# Patient Record
Sex: MALE | Race: WHITE | NOT HISPANIC OR LATINO | Employment: FULL TIME | URBAN - METROPOLITAN AREA
[De-identification: names, ages, dates, MRNs, and addresses within clinical notes are randomized per-mention and may not be internally consistent; named-entity substitution may affect disease eponyms.]

---

## 2017-01-09 ENCOUNTER — COMMUNICATION - HEALTHEAST (OUTPATIENT)
Dept: FAMILY MEDICINE | Facility: CLINIC | Age: 27
End: 2017-01-09

## 2017-01-09 DIAGNOSIS — R51.9 HEADACHE: ICD-10-CM

## 2017-05-15 ENCOUNTER — COMMUNICATION - HEALTHEAST (OUTPATIENT)
Dept: FAMILY MEDICINE | Facility: CLINIC | Age: 27
End: 2017-05-15

## 2017-05-15 DIAGNOSIS — R51.9 HEADACHE: ICD-10-CM

## 2017-08-14 ENCOUNTER — OFFICE VISIT - HEALTHEAST (OUTPATIENT)
Dept: FAMILY MEDICINE | Facility: CLINIC | Age: 27
End: 2017-08-14

## 2017-08-14 DIAGNOSIS — H61.20 IMPACTED CERUMEN: ICD-10-CM

## 2017-08-14 DIAGNOSIS — Z00.00 WELL ADULT EXAM: ICD-10-CM

## 2017-08-14 DIAGNOSIS — R63.4 WEIGHT LOSS: ICD-10-CM

## 2017-08-14 DIAGNOSIS — M41.20 SCOLIOSIS (AND KYPHOSCOLIOSIS), IDIOPATHIC: ICD-10-CM

## 2017-08-14 DIAGNOSIS — Q38.5 HIGH ARCHED PALATE: ICD-10-CM

## 2017-08-14 DIAGNOSIS — R51.9 NONINTRACTABLE EPISODIC HEADACHE, UNSPECIFIED HEADACHE TYPE: ICD-10-CM

## 2017-08-14 DIAGNOSIS — Z13.21 ENCOUNTER FOR VITAMIN DEFICIENCY SCREENING: ICD-10-CM

## 2017-08-14 DIAGNOSIS — R29.91 MARFANOID HABITUS: ICD-10-CM

## 2017-08-14 DIAGNOSIS — Z13.0 SCREENING FOR DEFICIENCY ANEMIA: ICD-10-CM

## 2017-08-14 ASSESSMENT — MIFFLIN-ST. JEOR: SCORE: 1391.71

## 2017-08-17 LAB
GLIADIN IGA SER-ACNC: 0.7 U/ML
GLIADIN IGG SER-ACNC: 0.6 U/ML
IGA SERPL-MCNC: 232 MG/DL (ref 65–400)
TTG IGA SER-ACNC: 0.6 U/ML
TTG IGG SER-ACNC: 2.2 U/ML

## 2017-08-18 ENCOUNTER — COMMUNICATION - HEALTHEAST (OUTPATIENT)
Dept: FAMILY MEDICINE | Facility: CLINIC | Age: 27
End: 2017-08-18

## 2017-08-22 ENCOUNTER — COMMUNICATION - HEALTHEAST (OUTPATIENT)
Dept: FAMILY MEDICINE | Facility: CLINIC | Age: 27
End: 2017-08-22

## 2018-05-01 ENCOUNTER — COMMUNICATION - HEALTHEAST (OUTPATIENT)
Dept: FAMILY MEDICINE | Facility: CLINIC | Age: 28
End: 2018-05-01

## 2018-05-01 DIAGNOSIS — R51.9 NONINTRACTABLE EPISODIC HEADACHE, UNSPECIFIED HEADACHE TYPE: ICD-10-CM

## 2018-06-18 ENCOUNTER — COMMUNICATION - HEALTHEAST (OUTPATIENT)
Dept: FAMILY MEDICINE | Facility: CLINIC | Age: 28
End: 2018-06-18

## 2018-11-05 ENCOUNTER — COMMUNICATION - HEALTHEAST (OUTPATIENT)
Dept: FAMILY MEDICINE | Facility: CLINIC | Age: 28
End: 2018-11-05

## 2018-11-05 DIAGNOSIS — R51.9 NONINTRACTABLE EPISODIC HEADACHE, UNSPECIFIED HEADACHE TYPE: ICD-10-CM

## 2019-01-10 ENCOUNTER — COMMUNICATION - HEALTHEAST (OUTPATIENT)
Dept: FAMILY MEDICINE | Facility: CLINIC | Age: 29
End: 2019-01-10

## 2019-01-10 DIAGNOSIS — R51.9 NONINTRACTABLE EPISODIC HEADACHE, UNSPECIFIED HEADACHE TYPE: ICD-10-CM

## 2019-01-17 ENCOUNTER — COMMUNICATION - HEALTHEAST (OUTPATIENT)
Dept: FAMILY MEDICINE | Facility: CLINIC | Age: 29
End: 2019-01-17

## 2019-01-17 DIAGNOSIS — R51.9 NONINTRACTABLE EPISODIC HEADACHE, UNSPECIFIED HEADACHE TYPE: ICD-10-CM

## 2019-02-28 ENCOUNTER — OFFICE VISIT - HEALTHEAST (OUTPATIENT)
Dept: FAMILY MEDICINE | Facility: CLINIC | Age: 29
End: 2019-02-28

## 2019-02-28 DIAGNOSIS — Z13.220 SCREENING FOR LIPOID DISORDERS: ICD-10-CM

## 2019-02-28 DIAGNOSIS — Z00.00 WELL ADULT EXAM: ICD-10-CM

## 2019-02-28 DIAGNOSIS — R51.9 NONINTRACTABLE EPISODIC HEADACHE, UNSPECIFIED HEADACHE TYPE: ICD-10-CM

## 2019-02-28 ASSESSMENT — MIFFLIN-ST. JEOR: SCORE: 1396.24

## 2019-03-03 LAB
CHOLEST SERPL-MCNC: 162 MG/DL
HDL SERPL QN: 10 NM
HDL SERPL-SCNC: 28.3 UMOL/L
HDLC SERPL-MCNC: 69 MG/DL (ref 40–59)
HLD.LARGE SERPL-SCNC: 11.8 UMOL/L
LDL SERPL QN: 21.5 NM
LDL SERPL-SCNC: 755 NMOL/L
LDL SMALL SERPL-SCNC: <165 NMOL/L
LDLC SERPL CALC-MCNC: 86 MG/DL
PATHOLOGY STUDY: ABNORMAL
TRIGL SERPL-MCNC: 36 MG/DL (ref 30–149)
VLDL LARGE SERPL-SCNC: 2 NMOL/L
VLDL SERPL QN: 49 NM

## 2019-03-04 ENCOUNTER — COMMUNICATION - HEALTHEAST (OUTPATIENT)
Dept: FAMILY MEDICINE | Facility: CLINIC | Age: 29
End: 2019-03-04

## 2020-04-06 ENCOUNTER — COMMUNICATION - HEALTHEAST (OUTPATIENT)
Dept: FAMILY MEDICINE | Facility: CLINIC | Age: 30
End: 2020-04-06

## 2020-04-06 DIAGNOSIS — R51.9 NONINTRACTABLE EPISODIC HEADACHE, UNSPECIFIED HEADACHE TYPE: ICD-10-CM

## 2020-06-18 ENCOUNTER — OFFICE VISIT (OUTPATIENT)
Dept: URGENT CARE | Facility: URGENT CARE | Age: 30
End: 2020-06-18
Payer: OTHER MISCELLANEOUS

## 2020-06-18 VITALS
BODY MASS INDEX: 19.29 KG/M2 | HEART RATE: 54 BPM | DIASTOLIC BLOOD PRESSURE: 84 MMHG | HEIGHT: 66 IN | TEMPERATURE: 98.4 F | SYSTOLIC BLOOD PRESSURE: 126 MMHG | WEIGHT: 120 LBS | OXYGEN SATURATION: 100 %

## 2020-06-18 DIAGNOSIS — S61.012A LACERATION OF LEFT THUMB WITHOUT FOREIGN BODY WITHOUT DAMAGE TO NAIL, INITIAL ENCOUNTER: Primary | ICD-10-CM

## 2020-06-18 PROCEDURE — 12001 RPR S/N/AX/GEN/TRNK 2.5CM/<: CPT | Mod: FA | Performed by: FAMILY MEDICINE

## 2020-06-18 PROCEDURE — 99203 OFFICE O/P NEW LOW 30 MIN: CPT | Mod: 25 | Performed by: FAMILY MEDICINE

## 2020-06-18 PROCEDURE — 90715 TDAP VACCINE 7 YRS/> IM: CPT | Performed by: FAMILY MEDICINE

## 2020-06-18 PROCEDURE — 90471 IMMUNIZATION ADMIN: CPT | Performed by: FAMILY MEDICINE

## 2020-06-18 RX ORDER — SUMATRIPTAN 100 MG/1
100 TABLET, FILM COATED ORAL
COMMUNITY
End: 2022-04-04

## 2020-06-18 ASSESSMENT — MIFFLIN-ST. JEOR: SCORE: 1442.07

## 2020-06-19 NOTE — PROGRESS NOTES
"Subjective:   Cayetano Agrawal is a 29 year old male who presents for   Chief Complaint   Patient presents with     Urgent Care     Pt in clinic to have eval for left hand thumb laceration.     Laceration     Last tetanus 2010     Reached for auto part with non dominant (left )hand and cut his left finger, comes in as this is deep and may need stitches. Able to move finger fully.     Thinks he is due for tetanus shot.     Next work shift is tomorrow.        There are no active problems to display for this patient.    Due to workman's compensation      ROS:  As above per HPI    Objective:   /84   Pulse 54   Temp 98.4  F (36.9  C) (Oral)   Ht 1.676 m (5' 6\")   Wt 54.4 kg (120 lb)   SpO2 100%   BMI 19.37 kg/m  , Body mass index is 19.37 kg/m .  Gen:  NAD, well-nourished, sitting in chair comfortably  HEENT: EOMI, sclera anicteric, Head normocephalic, ; nares patent; moist mucous membranes  Neck: trachea midline, no thyromegaly  CV:  Hemodynamically stable  Pulm:  no increased work of breathing   Extrem: no cyanosis, edema or clubbing  Skin: no obvious rashes or abnormalities  Psych: Euthymic, linear thoughts, normal rate of speech  Left hand: intact movement in flexion and extension of this thumb, horizontal laceration approx 1.25cm across mid-thumb exposing fatty layers, hemostasis present          No results found for any visits on 06/18/20.    Assessment & Plan:   Cayetano Agrawal, 29 year old male who presents with:  Laceration of left thumb without foreign body without damage to nail, initial encounter  - TDAP, IM (10 - 64 YRS) - Adacel  Given depth of laceration an assessment of the flexor tendon was performed which shows intact movement in flexion and also extension. Has intact sensation to touch. No damage to nailbed    Wound was soaked in water /chlorhexidine for approximately ten minutes, 1mL of 1% lidocaine used to anesthetize the area. FOUR of 5-0 nylon suture was used to bring together the wound " using interrupted sutures ( at areas showing a notable gape). Wound was dressed prior to discharge.     TDAP booster given today in clinic today.     Care tips provided in AVS    Moe Virgen MD   [facility]    The use of Dragon/Digital Chocolate dictation services may have been used to construct the content in this note; any grammatical or spelling errors are non-intentional. Please contact the author of this note directly if you are in need of any clarification.

## 2020-06-19 NOTE — PATIENT INSTRUCTIONS
Return in 8-10 days to have your stitches/staples removed. Call ahead for a 'suture removal' visit at one of our [facility] sites    Avoid soaking/submerging the area for 2 days. Okay to change the bandage we placed in 1 days. Change bandage/dressing at least once daily.   Keep the area moisturized with bacitracin or vaseline.     If you develop increasing pain/discomfort/swelling/redness/warmth, a fever or drainage of pus please return right away to be re-evaluated as this could be a sign of infection

## 2020-08-24 ENCOUNTER — COMMUNICATION - HEALTHEAST (OUTPATIENT)
Dept: FAMILY MEDICINE | Facility: CLINIC | Age: 30
End: 2020-08-24

## 2020-12-18 ENCOUNTER — OFFICE VISIT - HEALTHEAST (OUTPATIENT)
Dept: FAMILY MEDICINE | Facility: CLINIC | Age: 30
End: 2020-12-18

## 2020-12-18 DIAGNOSIS — Z13.220 SCREENING FOR LIPID DISORDERS: ICD-10-CM

## 2020-12-18 DIAGNOSIS — Z13.1 SCREENING FOR DIABETES MELLITUS: ICD-10-CM

## 2020-12-18 DIAGNOSIS — Z13.0 SCREENING FOR DEFICIENCY ANEMIA: ICD-10-CM

## 2020-12-18 DIAGNOSIS — Z00.00 WELL ADULT EXAM: ICD-10-CM

## 2020-12-18 DIAGNOSIS — Z13.21 ENCOUNTER FOR VITAMIN DEFICIENCY SCREENING: ICD-10-CM

## 2020-12-18 DIAGNOSIS — G95.0 SYRINGOMYELIA AND SYRINGOBULBIA (H): ICD-10-CM

## 2020-12-18 DIAGNOSIS — G43.109 MIGRAINE WITH AURA AND WITHOUT STATUS MIGRAINOSUS, NOT INTRACTABLE: ICD-10-CM

## 2020-12-18 DIAGNOSIS — Z20.9 EXPOSURE TO POTENTIAL INFECTION: ICD-10-CM

## 2020-12-18 DIAGNOSIS — M41.20 SCOLIOSIS (AND KYPHOSCOLIOSIS), IDIOPATHIC: ICD-10-CM

## 2020-12-18 LAB
ALBUMIN SERPL-MCNC: 4.4 G/DL (ref 3.5–5)
ALBUMIN UR-MCNC: NEGATIVE MG/DL
ALP SERPL-CCNC: 66 U/L (ref 45–120)
ALT SERPL W P-5'-P-CCNC: 17 U/L (ref 0–45)
ANION GAP SERPL CALCULATED.3IONS-SCNC: 10 MMOL/L (ref 5–18)
APPEARANCE UR: CLEAR
AST SERPL W P-5'-P-CCNC: 28 U/L (ref 0–40)
BASOPHILS # BLD AUTO: 0 THOU/UL (ref 0–0.2)
BASOPHILS NFR BLD AUTO: 1 % (ref 0–2)
BILIRUB SERPL-MCNC: 1.1 MG/DL (ref 0–1)
BILIRUB UR QL STRIP: NEGATIVE
BUN SERPL-MCNC: 19 MG/DL (ref 8–22)
CALCIUM SERPL-MCNC: 9.2 MG/DL (ref 8.5–10.5)
CHLORIDE BLD-SCNC: 103 MMOL/L (ref 98–107)
CHOLEST SERPL-MCNC: 193 MG/DL
CO2 SERPL-SCNC: 28 MMOL/L (ref 22–31)
COLOR UR AUTO: YELLOW
CREAT SERPL-MCNC: 0.74 MG/DL (ref 0.7–1.3)
EOSINOPHIL # BLD AUTO: 0.1 THOU/UL (ref 0–0.4)
EOSINOPHIL NFR BLD AUTO: 3 % (ref 0–6)
ERYTHROCYTE [DISTWIDTH] IN BLOOD BY AUTOMATED COUNT: 11.1 % (ref 11–14.5)
FASTING STATUS PATIENT QL REPORTED: NORMAL
GFR SERPL CREATININE-BSD FRML MDRD: >60 ML/MIN/1.73M2
GLUCOSE BLD-MCNC: 75 MG/DL (ref 70–125)
GLUCOSE UR STRIP-MCNC: NEGATIVE MG/DL
HCT VFR BLD AUTO: 42.1 % (ref 40–54)
HDLC SERPL-MCNC: 71 MG/DL
HGB BLD-MCNC: 14.8 G/DL (ref 14–18)
HGB UR QL STRIP: NEGATIVE
KETONES UR STRIP-MCNC: NEGATIVE MG/DL
LDLC SERPL CALC-MCNC: 115 MG/DL
LEUKOCYTE ESTERASE UR QL STRIP: NEGATIVE
LYMPHOCYTES # BLD AUTO: 1.6 THOU/UL (ref 0.8–4.4)
LYMPHOCYTES NFR BLD AUTO: 40 % (ref 20–40)
MCH RBC QN AUTO: 32.1 PG (ref 27–34)
MCHC RBC AUTO-ENTMCNC: 35.1 G/DL (ref 32–36)
MCV RBC AUTO: 92 FL (ref 80–100)
MONOCYTES # BLD AUTO: 0.4 THOU/UL (ref 0–0.9)
MONOCYTES NFR BLD AUTO: 10 % (ref 2–10)
NEUTROPHILS # BLD AUTO: 1.9 THOU/UL (ref 2–7.7)
NEUTROPHILS NFR BLD AUTO: 47 % (ref 50–70)
NITRATE UR QL: NEGATIVE
PH UR STRIP: 6 [PH] (ref 5–8)
PLATELET # BLD AUTO: 200 THOU/UL (ref 140–440)
PMV BLD AUTO: 7.8 FL (ref 7–10)
POTASSIUM BLD-SCNC: 3.9 MMOL/L (ref 3.5–5)
PROT SERPL-MCNC: 7.4 G/DL (ref 6–8)
RBC # BLD AUTO: 4.6 MILL/UL (ref 4.4–6.2)
SODIUM SERPL-SCNC: 141 MMOL/L (ref 136–145)
SP GR UR STRIP: 1.02 (ref 1–1.03)
TRIGL SERPL-MCNC: 34 MG/DL
TSH SERPL DL<=0.005 MIU/L-ACNC: 1.38 UIU/ML (ref 0.3–5)
UROBILINOGEN UR STRIP-ACNC: NORMAL
WBC: 4 THOU/UL (ref 4–11)

## 2020-12-18 ASSESSMENT — MIFFLIN-ST. JEOR: SCORE: 1407.3

## 2020-12-18 NOTE — ASSESSMENT & PLAN NOTE
Headaches  Migraine  Onset pressure between the nose  If he catches it early can typically go away with the use of sumatriptan  He has these about every other day  He does feel like he is light sensitive  If he gets nauseated feels like he is going to vomit  The only thing that he thinks triggers are not eating consistently  Evidence of mild to wake him up  Tried multiple preventatives including Topamax, propranolol, nortriptyline amitriptyline

## 2020-12-21 LAB
25(OH)D3 SERPL-MCNC: 17.7 NG/ML (ref 30–80)
25(OH)D3 SERPL-MCNC: 17.7 NG/ML (ref 30–80)

## 2020-12-22 ENCOUNTER — COMMUNICATION - HEALTHEAST (OUTPATIENT)
Dept: NURSING | Facility: CLINIC | Age: 30
End: 2020-12-22

## 2020-12-22 ENCOUNTER — OFFICE VISIT - HEALTHEAST (OUTPATIENT)
Dept: PHARMACY | Facility: CLINIC | Age: 30
End: 2020-12-22

## 2020-12-22 ENCOUNTER — COMMUNICATION - HEALTHEAST (OUTPATIENT)
Dept: INTERNAL MEDICINE | Facility: CLINIC | Age: 30
End: 2020-12-22

## 2020-12-22 DIAGNOSIS — G43.109 MIGRAINE WITH AURA AND WITHOUT STATUS MIGRAINOSUS, NOT INTRACTABLE: ICD-10-CM

## 2020-12-22 PROCEDURE — 99605 MTMS BY PHARM NP 15 MIN: CPT | Performed by: PHARMACIST

## 2020-12-22 PROCEDURE — 99607 MTMS BY PHARM ADDL 15 MIN: CPT | Performed by: PHARMACIST

## 2020-12-23 ENCOUNTER — COMMUNICATION - HEALTHEAST (OUTPATIENT)
Dept: INTERNAL MEDICINE | Facility: CLINIC | Age: 30
End: 2020-12-23

## 2020-12-23 ENCOUNTER — COMMUNICATION - HEALTHEAST (OUTPATIENT)
Dept: SCHEDULING | Facility: CLINIC | Age: 30
End: 2020-12-23

## 2020-12-31 ENCOUNTER — COMMUNICATION - HEALTHEAST (OUTPATIENT)
Dept: NURSING | Facility: CLINIC | Age: 30
End: 2020-12-31

## 2021-02-23 ENCOUNTER — COMMUNICATION - HEALTHEAST (OUTPATIENT)
Dept: INTERNAL MEDICINE | Facility: CLINIC | Age: 31
End: 2021-02-23

## 2021-02-23 DIAGNOSIS — G43.109 MIGRAINE WITH AURA AND WITHOUT STATUS MIGRAINOSUS, NOT INTRACTABLE: ICD-10-CM

## 2021-04-09 ENCOUNTER — COMMUNICATION - HEALTHEAST (OUTPATIENT)
Dept: PHARMACY | Facility: CLINIC | Age: 31
End: 2021-04-09

## 2021-04-09 DIAGNOSIS — G43.109 MIGRAINE WITH AURA AND WITHOUT STATUS MIGRAINOSUS, NOT INTRACTABLE: ICD-10-CM

## 2021-05-15 ENCOUNTER — HEALTH MAINTENANCE LETTER (OUTPATIENT)
Age: 31
End: 2021-05-15

## 2021-05-24 ENCOUNTER — COMMUNICATION - HEALTHEAST (OUTPATIENT)
Dept: INTERNAL MEDICINE | Facility: CLINIC | Age: 31
End: 2021-05-24

## 2021-05-24 DIAGNOSIS — R51.9 NONINTRACTABLE EPISODIC HEADACHE, UNSPECIFIED HEADACHE TYPE: ICD-10-CM

## 2021-05-31 VITALS — HEIGHT: 66 IN | BODY MASS INDEX: 17.68 KG/M2 | WEIGHT: 110 LBS

## 2021-06-02 VITALS — WEIGHT: 111 LBS | BODY MASS INDEX: 17.84 KG/M2 | HEIGHT: 66 IN

## 2021-06-05 VITALS
SYSTOLIC BLOOD PRESSURE: 100 MMHG | DIASTOLIC BLOOD PRESSURE: 60 MMHG | HEIGHT: 66 IN | HEART RATE: 91 BPM | OXYGEN SATURATION: 98 % | WEIGHT: 113.44 LBS | BODY MASS INDEX: 18.23 KG/M2 | TEMPERATURE: 97.7 F

## 2021-06-07 NOTE — TELEPHONE ENCOUNTER
Medication: Sumatriptan (IMTREX) 100 MG tablet  Pharmacy: [facility] in [place]  Last OV: 2/28/19 with Dr. Moss  Last refill: 2/28/19, #18 12 refills authorized by Dr. Ajay Morin, [facility]

## 2021-06-12 NOTE — PROGRESS NOTES
CHIEF COMPLAINT: Cayetano Agrawal had concerns including Annual Exam.    [place]: 1.............. had concerns including Annual Exam.    1. Well adult exam    2. Nonintractable episodic headache, unspecified headache type    3. High arched palate    4. Kyphoscoliosis    5. Impacted cerumen    6. Marfanoid habitus    7. Encounter for vitamin deficiency screening    8. Screening for deficiency anemia    9. Weight loss      No problem-specific Assessment & Plan notes found for this encounter.      CC:              Well adult  Headaches    Headaches have not changed 2-3 times weekly some benefit with acupuncture occasional nighttime awakening better with protein worse with alcohol especially beer  He has been [place] gluten-free    Depression seeing a therapist last 3-4 weeks has been feeling better to the PHQ 9 today    Family history reviewed      Associated Sx:   Some photophobia improvement with the use of Imitrex has tried vitamin therapy and multiple headache medications the best prophylactic is been acupuncture        SUBJECTIVE:  Cayetano Agrawal is a 27 y.o. male    No past medical history on file.  Past Surgical History:   Procedure Laterality Date     CERVICAL FUSION      [facility] spine     SPINAL GROWTH RODS       TONSILLECTOMY       Topiramate and Zonisamide  Current Outpatient Prescriptions   Medication Sig Dispense Refill     SUMAtriptan (IMITREX) 100 MG tablet TAKE 1 TABLET BY MOUTH FOR MIGRAINE RELIEF. MAY REPEAT IN 2 HOURS *MAX 200MG/DAY 18 tablet 6     No current facility-administered medications for this visit.      Family History   Problem Relation Age of Onset     Alcohol abuse Father      Social History     Social History     Marital status: Single     Spouse name: N/A     Number of children: N/A     Years of education: N/A     Social History Main Topics     Smoking status: Never Smoker     Smokeless tobacco: Never Used     Alcohol use 1.2 oz/week     2 Cans of beer per week     Drug use: No      "Sexual activity: Not Currently     Partners: Female     Birth control/ protection: Condom     Other Topics Concern     None     Social History Narrative     Patient Active Problem List   Diagnosis     Syringomyelia     Cerumen Impaction     Nummular Eczematous Dermatitis     Kyphoscoliosis     Headache                                              SOCIAL: He  reports that he has never smoked. He has never used smokeless tobacco. He reports that he drinks about 1.2 oz of alcohol per week  He reports that he does not use illicit drugs.    REVIEW OF SYSTEMS:     FAMILY HISTORY NOT PERTINENT TO CHIEF COMPLAINT OR PRESENTING PROBLEM  Review of systems otherwise negative as requested from patient, except   Positive ROS outlined and discussed in [place].    OBJECTIVE:  /60  Pulse 85  Resp 12  Ht 5' 6\" (1.676 m)  Wt 110 lb (49.9 kg)  SpO2 98%  BMI 17.75 kg/m2    GENERAL:     No acute distress.   Alert and oriented X 3         Physical:    Sclera clear  High arched palate  TMs are packed with cerumen  Thyroid pump nontender without nodules  Deformity of the neck back kyphoscoliosis  Lungs are clear  Cardiac no murmur regular rate and rhythm  Abdomen soft nontender with positive bowel sounds  Palpable femoral pulses  No joint deformities  Wingspan is probably as long as his height  Overlapping fingers at grasp the wrist          ASSESSMENT & PLAN      Cayetano was seen today for annual exam.    Diagnoses and all orders for this visit:    Well adult exam  -     Comprehensive Metabolic Panel  -     HM1(CBC and Differential)  -     Vitamin D, Total (25-Hydroxy)  -     C -Reactive Protein, High Sensitivity  -     Urinalysis-UC if Indicated  -     Vitamin B12  -     Thyroid Cascade  -     HM1 (CBC with Diff)    Nonintractable episodic headache, unspecified headache type  -     SUMAtriptan (IMITREX) 100 MG tablet; TAKE 1 TABLET BY MOUTH FOR MIGRAINE RELIEF. MAY REPEAT IN 2 HOURS *MAX 200MG/DAY  -     Celiac(Gluten)Antibody " Panel    High arched palate  -     Echo Complete; Future    Kyphoscoliosis  -     Echo Complete; Future    Impacted cerumen    Marfanoid habitus  -     Echo Complete; Future    Encounter for vitamin deficiency screening  -     Vitamin D, Total (25-Hydroxy)  -     Vitamin B12    Screening for deficiency anemia  -     HM1(CBC and Differential)  -     Vitamin B12  -     HM1 (CBC with Diff)    Weight loss  -     Celiac(Gluten)Antibody Panel      Lifestyle interventions interventions discussed today adequate protein intake, adequate hydration, avoidance of excessive caffeine, avoidance of tyramine rich foods  Whole Foods diet  Because of his high arch palate scoliosis as well as overlapping fingers will check an echocardiogram to rule out aortic arch problems or marfanoid-like syndrome    Return in about 1 year (around 8/14/2018), or if symptoms worsen or fail to improve.       Anticipatory Guidance and Symptomatic Cares Discussed   Advised to call back directly if there are further questions, or if these symptoms fail to improve as anticipated or worsen.  Return to clinic if patient has a clinical concern that warrants an exam.         30  Min Total Time, > 50% counseling and coordination of Care    Jose Antonio Moss MD  Family Medicine   [facility] [address] [phone]

## 2021-06-13 NOTE — PATIENT INSTRUCTIONS - HE
Great to see you Cayetano!!    We are doing blood work today    Because your headaches are fairly frequent and your history of back surgery neck surgery I think we should do an MRI of your brain this will check to see if there is excessive pressure which could be generating your headaches    I will have you talk with Cirilo Merchant she is our pharmacist here at [facility] she can help facilitate the approval of a migraine prevention medicine in the CRGP inhibitor category such as Ajovy, Aimovig, Emgality    Stay active    Let me know if there is any changes in your clinical condition    Adult Physical AVS      Thank you for scheduling and completing a Physical Check up!      There has been suggestions that this is an Unnecessary part of the current care for patients by some. I do not agree!    It is a reminder to take stock in an overall health plan.     It also hopefully will engage dialogue about wellness and focusing on measure to stay well and fit, hopefully avoiding extra trips to see us!      -----------Wellness Pillars-----------    1. Nutrient Dense Nutrition-- we are or will become and are activated by what we eat! It is paramount that we all focus on eating well. This means trying to eat ?hole Foods,  single ingredient foods that nourish and activate our bodies. Food that are high in nutrients help run our bodies in a way that herbert and can transform our health and help us to heal and repair. Major groups include. Fats----preferably plant based. These help ours brains and nervous system. Proteins---from plants and animals. Nuts, Beans and Animal proteins. These help our musculoskeletal system. Complex Carbohydrates---fresh fruits vegetables, and whole grains. In order to maintain balance, we must give our bodies balanced nutrition. There are things we should avoid. Most processed foods and all added sugar should be avoided. If you do not prepare your own food, order simple foods a la carte. Order a protein  and pair it with a side of vegetables. Vegetable should occupy more than half of your plate. Try to avoid simple carbohydrates like chips or fries.   2. Restorative Sleep----we all need sleep to allow our bodies to heal and repair. Sleep is indispensable and necessary. Good quality sleep is different from the simple quantitative amount of sleep. It is possible to sleep without getting any rest or restorative sleep. This is why we ask about refreshing sleep, because it is possible to sleep and not have restorative sleep. If your sleep is not refreshing, you may requires a visit with a Sleep Specialist to help sort this out. For quantity, aim for 8-10 hours daily.   3. Movement---exercise has numerous health benefits. Bottom line, in order to do the things we do in life, it is necessary to condition, nurture and repair our machine. Food provides the tools and the basic repair structure and vital nutrients. Rest allows time and focus for repair and restoration. Exercise conditions and activated reparative mechanisms. I would suggest thinking about one's high school weight as a rule and aim for a weight plus 15 pounds for men and 10 pounds for women as a goal. Also we should strive to engage in intentional activity 3 to 4 days of cardio fitness 30-60 minutes and 1-3 days of strength training. We want to be fit as we age and have stamina to do activities of daily living and beyond. Walk, bike, swim, run, box, dance, and so on, find your thing! The benefits are incredible! Exercise lowers blood pressure, helps treat and prevent diabetes and helps us live longer and in a way that is more satisfying. As Arnie says,  Just Do It!  4. Mindfulness----get in touch with your mind body connection. Take time daily to reflect on and be cognizant of how you are doing----eating, working, parenting, interacting with loved ones, friends and others. Be intentional and present in relating to things in which you are engaged.      Preventative Care    Colon Cancer screening. It may not be glamorous, but it saves lives and may save yours. There is colonoscopy and immuno-chemical testing. Pick your mode , but get it done. If there is a first degree relative that has had Colon Cancer, we may recommend screening 10 years before the age of that index case.     Breast Cancer Screening. This is mostly for women. Get to know the architecture of your breasts. If it makes you feel more comfortable, engage your partner as well. If something strikes you as ?ot right,  get it checked out. Mammogram breast cancer screening does detect cancers. Self breast exams can detect cancers. Guidelines vary but in general start screen in adulthood for self exams and mammography in your 40s. If there is a first degree relative or many with cancers, this may be earlier or involve genetic screening.     Pap Smears Cervical Cancer Screening---again women. Start screening after sexual activity or intercourse begins. Cervical cancer really is tied to exposure to HPV virus and this is related to sexual intercourse. Cervical cancer is treatable and preventable, as Pap smears should detect changes BEFORE cancerous transformation.   All women that have a cervix should be screen between 21-65 as a rule. Intervals vary based on age and medical history.     Vaccines. Yes there is controversy. But I still think vaccines save lives and reduce disease burden in our human populations. Please consider getting vaccinated as you are due for Vaccines.     About Vitamins and Supplements     I do recommend that adult and kids consider a multivitamin as way to enrich our nutrition.     A solid multivitamin. Ask one of us for recommendations. Our pharmacy carry some high quality lower cost vitamins that we have recommends.     [place] 3/6 Oils Fish Oils, Flax Oils, Krill Oils, Algea Oils, and Cumin Seed and Borage Oils are among the Omega 3s and 6s. Good oils nourish our nervous system and  engage our immune system in positive ways.     Vitamin D. If you are in [place]. You probably need some. We shoot for a level of 50-60. So sometime this takes staring lower and titrating up a supplement. Start at 1542-0386 IU wit a fatty meal and check levels.             Certain supplements may help with our gut's immune system or Microbiome.   Start with plants, many and of diverse colors.   Consider cultured foods with live active bacteria. Examples are Yogurt, Kefir and Kombucha.     Eat Prebiotic foods from the Chicori family, asparagus, bananas, inulin fibers and more.     Other supplements that may help are Zinc Carnosine, D-limonene, Vitamin D and Colostrum.     It may prudent to try the Elimination Diet and eliminate certain foods such as Wheat products, Dairy Products and Especially Refined Sugars.       We are screening multiple issues:     High Blood Pressure.   We ideally have readings less than 130 on the top number and less than 80 on the bottom.     Diabetes.   Diabetes is the body not processing sugars in an efficient way. When sugars are not dealt with in an efficient manner, every part of the body can be effected, the heart such as a heart attack or failure, the brain such as a stroke and really any part of the body. Insulin levels being overworked really drives diabetes. Lowering intake of simple sugar and exercising are two major ways to treat and stave off Diabetes.     Heart Disease:  Heart issues usually arise out of a combination of genetic issues and life stressors and choices.   Focusing on the Pillars of Wellness are ways to help prevent heart disease. Avoiding tobacco, limiting alcohol intake to moderate intake and addressing out  of balance cholesterol, presence of diabetes and persistently elevated blood pressure may require medications in addition to life style changes.     Skin cancer is typically due to cumulative Sun Damage. There are other genetic factor as well. If skin looks  irritated or a mole changes color, shape, size or has irregular borders, get it checked out. Skin exams can also save lives.     If you are Diabetic or Have Known Heart Disease. We have goals for your best Care     A1C. For Diabetics     This is a measure of how well controlled your sugars are over the last 3 months. In general, we would like the percent number less than 7% for most diabetics. In the morning and before all meals the sugar number should be less than 150. If you are diabetic and this is the case, you are managing well. The Pillars of Wellness are still a guide to keeping your body in balance.     Blood Pressure for Diabetics and Heart Disease     Top < 130 Bottom < 80  This goal of blood pressure control reduces Diabetes complications, such as stroke or heart attack. Life style first and add medication if consistently high.     No Tobacco. For Anyone!    Smoking tobacco or chewing produces by products that are particularly harmful to Diabetics, but really all of us. I implore you to not use tobacco products.     Aspirin for Heart Disease Patient     If you have high blood pressure, stroke or heart disease risk, you probably would benefit from a baby aspirin. There reasons to avoid aspirin such as allergies, risks for bleeding, etc. have the discussion of aspirin should be part of your therapy.     Statin Medications for Heart Disease, Vascular Disease or High Risk Patients    These medicine have statistically been shown to benefit Diabetic patients, especially those with disordered cholesterol profiles. I like to do an NMR panel that shows Atherogenic Risk (Stroke/ Heart Attack) and Insulin Resistance. Likely this may be recommended as part of you treatment plan.       Sima

## 2021-06-13 NOTE — PROGRESS NOTES
ASSESSMENT & PLAN    Migraine with aura and without status migrainosus, not intractable  Headaches  Migraine  Onset pressure between the nose  If he catches it early can typically go away with the use of sumatriptan  He has these about every other day  He does feel like he is light sensitive  If he gets nauseated feels like he is going to vomit  The only thing that he thinks triggers are not eating consistently  Evidence of mild to wake him up  Tried multiple preventatives including Topamax, propranolol, nortriptyline amitriptyline          Cayetano was seen today for annual exam.    Diagnoses and all orders for this visit:    Migraine with aura and without status migrainosus, not intractable  -     Ambulatory referral to Medication Management    Well adult exam  -     Comprehensive Metabolic Panel  -     HM1(CBC and Differential)  -     Vitamin D, Total (25-Hydroxy)  -     Urinalysis-UC if Indicated  -     Lipid Cascade  -     Thyroid [place]    Exposure to potential infection  -     COVID-19 Virus (Coronavirus) Antibody & Titer Reflex    Screening for diabetes mellitus  -     Comprehensive Metabolic Panel  -     Urinalysis-UC if Indicated    Encounter for vitamin deficiency screening  -     Vitamin D, Total (25-Hydroxy)    Screening for lipid disorders  -     Lipid Cascade  -     Thyroid [place]    Screening for deficiency anemia  -     HM1(CBC and Differential)    Other orders  -     Cancel: Influenza, Recombinant, Inj, Quadrivalent, PF, 18+YRS        Patient Instructions   Great to see you Cayetano!!    We are doing blood work today    Because your headaches are fairly frequent and your history of back surgery neck surgery I think we should do an MRI of your brain this will check to see if there is excessive pressure which could be generating your headaches    I will have you talk with Cirilo Merchant she is our pharmacist here at [facility] she can help facilitate the approval of a migraine prevention medicine in the  CRGP inhibitor category such as Ajovy, Aimovig, Emgality    Stay active    Let me know if there is any changes in your clinical condition    Adult Physical AVS      Thank you for scheduling and completing a Physical Check up!      There has been suggestions that this is an Unnecessary part of the current care for patients by some. I do not agree!    It is a reminder to take stock in an overall health plan.     It also hopefully will engage dialogue about wellness and focusing on measure to stay well and fit, hopefully avoiding extra trips to see us!      -----------Wellness Pillars-----------    1. Nutrient Dense Nutrition-- we are or will become and are activated by what we eat! It is paramount that we all focus on eating well. This means trying to eat ?hole Foods,  single ingredient foods that nourish and activate our bodies. Food that are high in nutrients help run our bodies in a way that herbert and can transform our health and help us to heal and repair. Major groups include. Fats----preferably plant based. These help ours brains and nervous system. Proteins---from plants and animals. Nuts, Beans and Animal proteins. These help our musculoskeletal system. Complex Carbohydrates---fresh fruits vegetables, and whole grains. In order to maintain balance, we must give our bodies balanced nutrition. There are things we should avoid. Most processed foods and all added sugar should be avoided. If you do not prepare your own food, order simple foods a la carte. Order a protein and pair it with a side of vegetables. Vegetable should occupy more than half of your plate. Try to avoid simple carbohydrates like chips or fries.   2. Restorative Sleep----we all need sleep to allow our bodies to heal and repair. Sleep is indispensable and necessary. Good quality sleep is different from the simple quantitative amount of sleep. It is possible to sleep without getting any rest or restorative sleep. This is why we ask about  refreshing sleep, because it is possible to sleep and not have restorative sleep. If your sleep is not refreshing, you may requires a visit with a Sleep Specialist to help sort this out. For quantity, aim for 8-10 hours daily.   3. Movement---exercise has numerous health benefits. Bottom line, in order to do the things we do in life, it is necessary to condition, nurture and repair our machine. Food provides the tools and the basic repair structure and vital nutrients. Rest allows time and focus for repair and restoration. Exercise conditions and activated reparative mechanisms. I would suggest thinking about one's high school weight as a rule and aim for a weight plus 15 pounds for men and 10 pounds for women as a goal. Also we should strive to engage in intentional activity 3 to 4 days of cardio fitness 30-60 minutes and 1-3 days of strength training. We want to be fit as we age and have stamina to do activities of daily living and beyond. Walk, bike, swim, run, box, dance, and so on, find your thing! The benefits are incredible! Exercise lowers blood pressure, helps treat and prevent diabetes and helps us live longer and in a way that is more satisfying. As Arnie says,  Just Do It!  4. Mindfulness----get in touch with your mind body connection. Take time daily to reflect on and be cognizant of how you are doing----eating, working, parenting, interacting with loved ones, friends and others. Be intentional and present in relating to things in which you are engaged.     Preventative Care    Colon Cancer screening. It may not be glamorous, but it saves lives and may save yours. There is colonoscopy and immuno-chemical testing. Pick your mode , but get it done. If there is a first degree relative that has had Colon Cancer, we may recommend screening 10 years before the age of that index case.     Breast Cancer Screening. This is mostly for women. Get to know the architecture of your breasts. If it makes you feel more  comfortable, engage your partner as well. If something strikes you as ?ot right,  get it checked out. Mammogram breast cancer screening does detect cancers. Self breast exams can detect cancers. Guidelines vary but in general start screen in adulthood for self exams and mammography in your 40s. If there is a first degree relative or many with cancers, this may be earlier or involve genetic screening.     Pap Smears Cervical Cancer Screening---again women. Start screening after sexual activity or intercourse begins. Cervical cancer really is tied to exposure to HPV virus and this is related to sexual intercourse. Cervical cancer is treatable and preventable, as Pap smears should detect changes BEFORE cancerous transformation.   All women that have a cervix should be screen between 21-65 as a rule. Intervals vary based on age and medical history.     Vaccines. Yes there is controversy. But I still think vaccines save lives and reduce disease burden in our human populations. Please consider getting vaccinated as you are due for Vaccines.     About Vitamins and Supplements     I do recommend that adult and kids consider a multivitamin as way to enrich our nutrition.     A solid multivitamin. Ask one of us for recommendations. Our pharmacy carry some high quality lower cost vitamins that we have recommends.     [place] 3/6 Oils Fish Oils, Flax Oils, Krill Oils, Algea Oils, and Cumin Seed and Borage Oils are among the Omega 3s and 6s. Good oils nourish our nervous system and engage our immune system in positive ways.     Vitamin D. If you are in [place]. You probably need some. We shoot for a level of 50-60. So sometime this takes staring lower and titrating up a supplement. Start at 0305-2308 IU wit a fatty meal and check levels.             Certain supplements may help with our gut's immune system or Microbiome.   Start with plants, many and of diverse colors.   Consider cultured foods with live active bacteria.  Examples are Yogurt, Kefir and Kombucha.     Eat Prebiotic foods from the Chicori family, asparagus, bananas, inulin fibers and more.     Other supplements that may help are Zinc Carnosine, D-limonene, Vitamin D and Colostrum.     It may prudent to try the Elimination Diet and eliminate certain foods such as Wheat products, Dairy Products and Especially Refined Sugars.       We are screening multiple issues:     High Blood Pressure.   We ideally have readings less than 130 on the top number and less than 80 on the bottom.     Diabetes.   Diabetes is the body not processing sugars in an efficient way. When sugars are not dealt with in an efficient manner, every part of the body can be effected, the heart such as a heart attack or failure, the brain such as a stroke and really any part of the body. Insulin levels being overworked really drives diabetes. Lowering intake of simple sugar and exercising are two major ways to treat and stave off Diabetes.     Heart Disease:  Heart issues usually arise out of a combination of genetic issues and life stressors and choices.   Focusing on the Pillars of Wellness are ways to help prevent heart disease. Avoiding tobacco, limiting alcohol intake to moderate intake and addressing out  of balance cholesterol, presence of diabetes and persistently elevated blood pressure may require medications in addition to life style changes.     Skin cancer is typically due to cumulative Sun Damage. There are other genetic factor as well. If skin looks irritated or a mole changes color, shape, size or has irregular borders, get it checked out. Skin exams can also save lives.     If you are Diabetic or Have Known Heart Disease. We have goals for your best Care     A1C. For Diabetics     This is a measure of how well controlled your sugars are over the last 3 months. In general, we would like the percent number less than 7% for most diabetics. In the morning and before all meals the sugar number  should be less than 150. If you are diabetic and this is the case, you are managing well. The Pillars of Wellness are still a guide to keeping your body in balance.     Blood Pressure for Diabetics and Heart Disease     Top < 130 Bottom < 80  This goal of blood pressure control reduces Diabetes complications, such as stroke or heart attack. Life style first and add medication if consistently high.     No Tobacco. For Anyone!    Smoking tobacco or chewing produces by products that are particularly harmful to Diabetics, but really all of us. I implore you to not use tobacco products.     Aspirin for Heart Disease Patient     If you have high blood pressure, stroke or heart disease risk, you probably would benefit from a baby aspirin. There reasons to avoid aspirin such as allergies, risks for bleeding, etc. have the discussion of aspirin should be part of your therapy.     Statin Medications for Heart Disease, Vascular Disease or High Risk Patients    These medicine have statistically been shown to benefit Diabetic patients, especially those with disordered cholesterol profiles. I like to do an NMR panel that shows Atherogenic Risk (Stroke/ Heart Attack) and Insulin Resistance. Likely this may be recommended as part of you treatment plan.       DanL      Return in about 6 months (around 6/18/2021).       Little interest or pleasure in doing things: Not at all  Feeling down, depressed, or hopeless: Not at all    CHIEF COMPLAINT: Cayetano Agrawal had concerns including Annual Exam (fasting).    [place]: 1.............. SUBJECTIVE:  Cayetano Agrawal is a 30 y.o. male had concerns including Annual Exam (fasting).    1. Migraine with aura and without status migrainosus, not intractable    2. Well adult exam    3. Exposure to potential infection    4. Screening for diabetes mellitus    5. Encounter for vitamin deficiency screening    6. Screening for lipid disorders    7. Screening for deficiency anemia          Allergies  "  Allergen Reactions     Topiramate      Zonisamide                          SOCIAL: He  reports that he has never smoked. He has never used smokeless tobacco. He reports current alcohol use of about 2.0 standard drinks of alcohol per week. He reports that he does not use drugs.    REVIEW OF SYSTEMS:   Family history not pertinent to chief complaint or presenting problem    Review of systems otherwise negative as requested from patient, except   Those positive ROS outlined and discussed in [place].      VITALS:  Vitals:    12/18/20 0943   BP: 100/60   Patient Site: Left Arm   Patient Position: Sitting   Cuff Size: Adult Regular   Pulse: 91   Temp: 97.7  F (36.5  C)   TempSrc: Oral   SpO2: 98%   Weight: 113 lb 7 oz (51.5 kg)   Height: 5' 6\" (1.676 m)     Wt Readings from Last 3 Encounters:   12/18/20 113 lb 7 oz (51.5 kg)   02/28/19 111 lb (50.3 kg)   08/14/17 110 lb (49.9 kg)     Body mass index is 18.31 kg/m .    Physical Exam:      Physical:  General Appearance: Healthy-appearingy.   Head:  No deformity  Eyes: Sclerae white, pupils equal and reactive, extra ocular movements intact   Ears: Well-positioned, well-formed pinnae; TM pearly white, translucent, no bulging   Nose: Clear, normal mucosa no drainage or crusting  Throat: Lips, tongue, and mucosa are moist, pink and intact; tongue no thrush oral pharynx no injection or lesions  Neck: Supple, symmetric ROM no nodes   No carotid Buits  Chest: Lungs clear to auscultation, no retractions  Back significant scoliosis  Old scar midline back  Mild pectus deformity    Heart: Regular rate & rhythm, S1 S2, no murmur  Abdomen: Soft, non-tender, no masses; umbilical area normal   Pulses: Equal femoral pulses  : No hernia palpable   Extremities: Well-perfused, warm and dry, No Edema  Palpable Pulses Bilateral  Neuro: Easily aroused good tone NO tremor   Skin  No Rash multiple compound nevi no dysplastic-looking nev           I spent 40 minutes with this patient.  This " includes pre-visit, intra-visit and post visit work an evaluation with regards to Cayetano was seen today for annual exam.    Diagnoses and all orders for this visit:    Migraine with aura and without status migrainosus, not intractable  -     Ambulatory referral to Medication Management    Well adult exam  -     Comprehensive Metabolic Panel  -     HM1(CBC and Differential)  -     Vitamin D, Total (25-Hydroxy)  -     Urinalysis-UC if Indicated  -     Lipid Cascade  -     Thyroid [place]    Exposure to potential infection  -     COVID-19 Virus (Coronavirus) Antibody & Titer Reflex    Screening for diabetes mellitus  -     Comprehensive Metabolic Panel  -     Urinalysis-UC if Indicated    Encounter for vitamin deficiency screening  -     Vitamin D, Total (25-Hydroxy)    Screening for lipid disorders  -     Lipid Cascade  -     Thyroid [place]    Screening for deficiency anemia  -     HM1(CBC and Differential)    Other orders  -     Cancel: Influenza, Recombinant, Inj, Quadrivalent, PF, 18+YRS        Jose Antonio Moss MD  Family Medicine   [facility] [id number] [phone]

## 2021-06-14 NOTE — TELEPHONE ENCOUNTER
Central PA team  [phone]  Pool: HE PA MED ([id number])          PA has been initiated.       PA form completed and faxed insurance via Cover My Meds     Key:  [id number]     Medication:  Aimovig 70MG/ML auto-injectors    Insurance:  [employer]         Response will be received via fax and may take up to 5-10 business days depending on plan

## 2021-06-14 NOTE — PROGRESS NOTES
MTM Initial Encounter  Assessment & Plan                                                       1. Migraine with aura and without status migrainosus, not intractable  Poorly controlled, with migraines occurring greater than 15 days/month.  He has previously worked with neurology, with trial and failure of several prophylactic regimens including tricyclic antidepressants, antiseizure, and beta-blockers.  Migraines are longstanding and worsening with age, and difficulties with identifying additional triggers outside of alcohol and hunger.  Reviewed benefits versus risks of CGRP inhibitor and Botox.  Reviewed mechanism of action, potential adverse effects and administration of CGRP inhibitor.  Through shared decision-making, he would like to try CGRP inhibitor first, with consideration of trial of Botox in the future.  Recommend to initiate Aimovig, pending insurance coverage.  We will follow-up via [employer] for further details once covered product is identified.  - initiate erenumab-aooe (AIMOVIG AUTOINJECTOR) 70 mg/mL atIn; Inject 70 mg under the skin every 28 days.  Dispense: 1 mL; Refill: 1    Follow Up  Return in about 1 week (around 12/29/2020) for Bernice Merchant, PharmD, BCACP, via [employer].    Subjective & Objective                                                     Cayetano Agrawal is a 30 y.o. male called for an initial visit for Medication Therapy Management. He was referred to me from Jose Antonio Moss MD    Patient consented to a telehealth visit: Yes    Reason for visit: Medication management initial    Medication Adherence/Access: Stable, no issues identified.  He does not take chronic medications.     Migraines: Cayetano has had these headaches since he was 10-13 years old, had several surgeries when he was a child, until spinal fusion when he was 11. As a child he did not take much medication, but have worsened over time. Sumatriptan is the only med that has helped and totally relies on this per month. He is  able to get rid of the migraine with 1 tablet, sometimes he will have to re-dose 2 hours later. Depending on how quickly he is able to catch his migraines, he can usually take care of this in 45min-1 hour later. First sign of pressure between his nose and eyebrow line and builds into a headache. He will become light sensitive. Noise doesn't bother him. He does not remember noticing an aura. He had been recommended to see a neurologist at one point, he followed up for 9 months and would try several anti-seizure medications to prevent migraines, he was getting fed up with how poorly effective this had been. The only medication he poorly tolerated was topiramate, he did end up in the ER one night due to worsening headaches. Also tried propanolol and nortriptyline/amitriptyline. He did previously try accupuncture but didn't feel this had much of an impact. His migraines occur every other day. NSAIDs can help if he catches the headache very early. If he starts feeling the nose pressure it will be too late for the NSAID to work. He had been monitoring blood sugars, and monitoring many different factors to look for migraine triggers. If he does not eat regularly, he will have a headache. He has also noticed alcohol may worsen migraines as well. Activity does not consistently seem to affect him. He does smoke marijuana twice per week without benefit.     PMH: reviewed in EPIC   Allergies/ADRs: reviewed in EPIC   Alcohol: max 2 drinks socially per week    Tobacco:   Social History     Tobacco Use   Smoking Status Never Smoker   Smokeless Tobacco Never Used     Today's Vitals:   BP Readings from Last 3 Encounters:   12/18/20 100/60   02/28/19 110/66   08/14/17 114/60   ----------------    The patient declined an after visit summary    I spent 30 minutes with this patient today.   All changes were made via collaborative practice agreement with Jose Antonio Moss MD. A copy of the visit note was provided to the patient's  provider.     Cirilo Merchant, PharmD, BCACP  Medication Management (MTM) Pharmacist  [facility]    Telemedicine Visit Details    Type of service:  Telephone     Start Time: 1:30PM  End Time: 2PM    Originating Location (pt. Location): Home    Distant Location (provider location):  [place] [facility]NAG[facility] [place]    Mode of Communication: Telephone    Current Outpatient Medications   Medication Sig Dispense Refill     ibuprofen (ADVIL,MOTRIN) 200 MG tablet Take 600 mg by mouth as needed.       SUMAtriptan (IMITREX) 100 MG tablet TAKE 1 TABLET (100 MG TOTAL) BY MOUTH EVERY 2 (TWO) HOURS AS NEEDED FOR MIGRAINE ( MG/DAY). 18 tablet 12     erenumab-aooe (AIMOVIG AUTOINJECTOR) 70 mg/mL atIn Inject 70 mg under the skin every 28 days. 1 mL 1     No current facility-administered medications for this visit.         Medication Therapy Recommendations  Migraine with aura and without status migrainosus, not intractable    Rationale: Untreated condition - Needs additional medication therapy - Indication   Recommendation: Start Medication - initiate aimovig 70mg monthly   Status: Accepted per CPA

## 2021-06-15 NOTE — TELEPHONE ENCOUNTER
RN cannot approve Refill Request    RN can NOT refill this medication med is not covered by policy/route to provider. Last office visit: Visit date not found Last Physical: 12/18/2020 Last MTM visit: Visit date not found Last visit same specialty: Visit date not found.  Next visit within 3 mo: Visit date not found  Next physical within 3 mo: Visit date not found      Maria Dolores Carlos, [facility] Triage/Med Refill 2/23/2021    Requested Prescriptions   Pending Prescriptions Disp Refills     AIMOVIG AUTOINJECTOR 70 mg/mL atIn [Pharmacy Med Name: Aimovig Autoinjector 70 mg/mL subcutaneous auto-injector] 1 mL 1     Sig: Inject 70 mg under the skin every 28 days       There is no refill protocol information for this order

## 2021-06-17 NOTE — TELEPHONE ENCOUNTER
Telephone Encounter by Jaelyn Bynum at 12/23/2020  8:21 AM     Author: Jaelyn Bynum Service: -- Author Type: --    Filed: 12/23/2020  8:25 AM Encounter Date: 12/23/2020 Status: Signed    : Jaelyn Bynum APPROVED:    Approval start date: 11/23/2020  Approval end date:  12/23/2021    Pharmacy has been notified of approval and will contact patient when medication is ready for pickup.

## 2021-06-17 NOTE — TELEPHONE ENCOUNTER
Refill Approved    Rx renewed per Medication Renewal Policy. Medication was last renewed on 4/6/20.    Eric Campos, [facility] Triage/Med Refill 5/25/2021     Requested Prescriptions   Pending Prescriptions Disp Refills     SUMAtriptan (IMITREX) 100 MG tablet [Pharmacy Med Name: sumatriptan 100 mg tablet] 18 tablet 10     Sig: TAKE ONE TABLET BY MOUTH EVERY 2 HOURS AS NEEDED FOR MIGRAINE, max 2 tablets/24 hours       Triptans Refill Protocol Passed - 5/24/2021  9:03 AM        Passed - PCP or prescribing provider visit in past 12 months       Last office visit with prescriber/PCP: Visit date not found OR same dept: Visit date not found OR same specialty: Visit date not found  Last physical: 12/18/2020 Last MTM visit: Visit date not found   Next visit within 3 mo: Visit date not found  Next physical within 3 mo: Visit date not found  Prescriber OR PCP: Jose Antonio Moss MD  Last diagnosis associated with med order: 1. Nonintractable episodic headache, unspecified headache type  - SUMAtriptan (IMITREX) 100 MG tablet [Pharmacy Med Name: sumatriptan 100 mg tablet]; TAKE ONE TABLET BY MOUTH EVERY 2 HOURS AS NEEDED FOR MIGRAINE, max 2 tablets/24 hours  Dispense: 18 tablet; Refill: 10    If protocol passes may refill for 12 months if within 3 months of last provider visit (or a total of 15 months).

## 2021-06-23 NOTE — TELEPHONE ENCOUNTER
FYI - Status Update  Who is Calling: Patient  Update: He is out of medication. Dr. Moss is not in clinic today. Can a covering  review?  Okay to leave a detailed message?:  No return call needed

## 2021-06-23 NOTE — TELEPHONE ENCOUNTER
RN cannot approve Refill Request    RN can NOT refill this medication PCP messaged that patient is overdue for Office Visit.     Jadyn Jasmine, [facility] Triage/Med Refill 1/11/2019    Requested Prescriptions   Pending Prescriptions Disp Refills     SUMAtriptan (IMITREX) 100 MG tablet [Pharmacy Med Name: SUMATRIPTAN SUCC 100 MG TABLET] 18 tablet 1     Sig: TAKE 1 TABLET (100 MG TOTAL) BY MOUTH EVERY 2 (TWO) HOURS AS NEEDED FOR MIGRAINE ( MG/DAY).    Triptans Refill Protocol Failed - 1/10/2019 11:14 AM       Failed - PCP or prescribing provider visit in past 12 months      Last office visit with prescriber/PCP: Visit date not found OR same dept: Visit date not found OR same specialty: Visit date not found  Last physical: 8/14/2017 Last MTM visit: Visit date not found   Next visit within 3 mo: Visit date not found  Next physical within 3 mo: Visit date not found  Prescriber OR PCP: Jose Antonio Moss MD  Last diagnosis associated with med order: 1. Nonintractable episodic headache, unspecified headache type  - SUMAtriptan (IMITREX) 100 MG tablet [Pharmacy Med Name: SUMATRIPTAN SUCC 100 MG TABLET]; TAKE 1 TABLET (100 MG TOTAL) BY MOUTH EVERY 2 (TWO) HOURS AS NEEDED FOR MIGRAINE ( MG/DAY).  Dispense: 18 tablet; Refill: 1    If protocol passes may refill for 12 months if within 3 months of last provider visit (or a total of 15 months).

## 2021-06-23 NOTE — TELEPHONE ENCOUNTER
Pt is out of his triptan and needs a refill. Has an appointment scheduled on 2/28/19 to see Dr Moss.        RN cannot approve Refill Request    RN can NOT refill this medication Protocol failed and NO refill given. Last office visit: Visit date not found Last Physical: 8/14/2017 Last MTM visit: Visit date not found Last visit same specialty: Visit date not found.  Next visit within 3 mo: Visit date not found  Next physical within 3 mo: Visit date not found      Iveth Alanis, [facility] Triage/Med Refill 1/17/2019    Requested Prescriptions   Pending Prescriptions Disp Refills     SUMAtriptan (IMITREX) 100 MG tablet 18 tablet 0     Sig: TAKE 1 TABLET (100 MG TOTAL) BY MOUTH EVERY 2 (TWO) HOURS AS NEEDED FOR MIGRAINE ( MG/DAY).    Triptans Refill Protocol Failed - 1/17/2019 10:30 AM       Failed - PCP or prescribing provider visit in past 12 months      Last office visit with prescriber/PCP: Visit date not found OR same dept: Visit date not found OR same specialty: Visit date not found  Last physical: 8/14/2017 Last MTM visit: Visit date not found   Next visit within 3 mo: Visit date not found  Next physical within 3 mo: Visit date not found  Prescriber OR PCP: Jose Antonio Moss MD  Last diagnosis associated with med order: 1. Nonintractable episodic headache, unspecified headache type  - SUMAtriptan (IMITREX) 100 MG tablet; TAKE 1 TABLET (100 MG TOTAL) BY MOUTH EVERY 2 (TWO) HOURS AS NEEDED FOR MIGRAINE ( MG/DAY).  Dispense: 18 tablet; Refill: 0    If protocol passes may refill for 12 months if within 3 months of last provider visit (or a total of 15 months).

## 2021-06-23 NOTE — TELEPHONE ENCOUNTER
Who is calling:  Patient  Reason for Call:  Patient stated he is out and would like a covering provider, address this refill. Patient was transferred to scheduling to make an appointment.  Date of last appointment with primary care: 8/14/17  Has the patient been recently seen:  No  Okay to leave a detailed message: Yes  [phone]

## 2021-06-23 NOTE — TELEPHONE ENCOUNTER
Pt is out of his triptan and needs a refill. Has an appointment scheduled on 2/28/19 to see Dr Moss.

## 2021-06-24 NOTE — PATIENT INSTRUCTIONS - HE
The Role of Supplements:  Headache prevention      Cannabinoids  CBD Oil  15 mg Daily  Blue Bird   Magnesium Glycinate  500 mg Daily   Vitamin B2 100-400 mg    Try these 3 above as a preventative  If this is not successful consider amitriptyline 10 mg at bedtime please message me in my chart if you would like to have this Rx      Butterbur (Short Term)    Petadolex Mast Cell Stabilizer 75 mg Twice Daily  Hazel----GI Friendly 1-3 gram Daily>>>lower CRP/HgA1C/HDL improves     Melatonin 3-10mg  Ginkgo  B[place]wellia AKBA Pure Encapsulations Brand   100mg  Daily 5-Loxin    Cannabinoids  CBD Oil  15 mg Daily  Blue Bird   CoQ10 200-300mg  (Ubiquinol as well more concentrated)  Magnesium Glycinate  500 mg Daily  Vitamin D  7907-9423 Units  Shoot for level of 60   Vitamin B2 100-400 mg  Alpha Lipoic Acid  (Thioctic Acid) 600mg + NAC  500 mg  Omega 3  (EPA/DHA)  2000 gram EPA 2246-2973>DHA  [place] Brand  [place] Naturals BRand   [place] Bark Extract (aspirin like)  Yoga?  Acupuncture      Plus CBD     This website sells wholesale vitamins.  With this log on you may purchase Supplements at 35% discount.    First to locate the [email or web address] website.    There will appear of blue box that asks you to enter your provider's code.    In the blue box you enter NATURALPARTShopRunner (all caps)    Then you create your own account.  After this you may buy supplements on their website of 35% discount.    NATURAL PARTNERS:   [email or web address]  PROVIDER CODE:  NATURALCHARISMA  Create your own account.    [email or web address]

## 2021-06-29 ENCOUNTER — OFFICE VISIT (OUTPATIENT)
Dept: URGENT CARE | Facility: URGENT CARE | Age: 31
End: 2021-06-29
Payer: COMMERCIAL

## 2021-06-29 VITALS
TEMPERATURE: 98 F | OXYGEN SATURATION: 98 % | DIASTOLIC BLOOD PRESSURE: 78 MMHG | RESPIRATION RATE: 20 BRPM | SYSTOLIC BLOOD PRESSURE: 120 MMHG | HEART RATE: 78 BPM

## 2021-06-29 DIAGNOSIS — J02.9 SORE THROAT: Primary | ICD-10-CM

## 2021-06-29 DIAGNOSIS — J02.0 ACUTE STREPTOCOCCAL PHARYNGITIS: ICD-10-CM

## 2021-06-29 LAB
DEPRECATED S PYO AG THROAT QL EIA: NEGATIVE
SPECIMEN SOURCE: NORMAL

## 2021-06-29 PROCEDURE — U0003 INFECTIOUS AGENT DETECTION BY NUCLEIC ACID (DNA OR RNA); SEVERE ACUTE RESPIRATORY SYNDROME CORONAVIRUS 2 (SARS-COV-2) (CORONAVIRUS DISEASE [COVID-19]), AMPLIFIED PROBE TECHNIQUE, MAKING USE OF HIGH THROUGHPUT TECHNOLOGIES AS DESCRIBED BY CMS-2020-01-R: HCPCS | Performed by: PHYSICIAN ASSISTANT

## 2021-06-29 PROCEDURE — U0005 INFEC AGEN DETEC AMPLI PROBE: HCPCS | Performed by: PHYSICIAN ASSISTANT

## 2021-06-29 PROCEDURE — 99203 OFFICE O/P NEW LOW 30 MIN: CPT | Performed by: PHYSICIAN ASSISTANT

## 2021-06-29 PROCEDURE — 87651 STREP A DNA AMP PROBE: CPT | Performed by: PHYSICIAN ASSISTANT

## 2021-06-29 PROCEDURE — 99N1174 PR STATISTIC STREP A RAPID: Performed by: PHYSICIAN ASSISTANT

## 2021-06-29 RX ORDER — AMOXICILLIN 500 MG/1
500 CAPSULE ORAL 2 TIMES DAILY
Qty: 20 CAPSULE | Refills: 0 | Status: SHIPPED | OUTPATIENT
Start: 2021-06-29 | End: 2021-07-09

## 2021-06-29 RX ORDER — ERENUMAB-AOOE 140 MG/ML
INJECTION, SOLUTION SUBCUTANEOUS
COMMUNITY
Start: 2021-06-28 | End: 2022-01-25

## 2021-06-29 ASSESSMENT — ENCOUNTER SYMPTOMS
SORE THROAT: 1
HEADACHES: 0
TROUBLE SWALLOWING: 1
SHORTNESS OF BREATH: 0
VOMITING: 0

## 2021-06-30 LAB
LABORATORY COMMENT REPORT: NORMAL
SARS-COV-2 RNA RESP QL NAA+PROBE: NEGATIVE
SARS-COV-2 RNA RESP QL NAA+PROBE: NORMAL
SPECIMEN SOURCE: NORMAL
STREP GROUP A PCR: NOT DETECTED

## 2021-06-30 NOTE — PROGRESS NOTES
Assessment & Plan   Problem List Items Addressed This Visit     None      Visit Diagnoses     Sore throat    -  Primary    Relevant Orders    Symptomatic COVID-19 Virus (Coronavirus) by PCR    Streptococcus A Rapid Scr w Reflx to PCR (Completed)    Acute streptococcal pharyngitis        Relevant Medications    amoxicillin (AMOXIL) 500 MG capsule           Take Amoxicillin as directed. Take ibuprofen as needed for fever or pain. Gargle with hot salty water. Try lemon tea with honey.     Return in about 1 week (around 7/6/2021), or if symptoms worsen or fail to improve.    Jefry Price PA-C  [facility] JOSE Monteiro is a 30 year old who presents for the following health issues   Patient presents with sore throat x 1 week. COVID test last Monday, it was negative. There is pain with swallowing, he noticed red and irritated throat.  He denies any fever, nausea, vomiting, diarrhea, constipation. He reports history of strep as child but not as adult. He denies any sick contact. He has not tried at home medication.     Pharyngitis   This is a new problem. The current episode started more than 1 week ago. The problem has been gradually improving. There has been no fever. Associated symptoms include congestion and trouble swallowing. Pertinent negatives include no vomiting, no ear discharge, no ear pain, no headaches and no shortness of breath.        Review of Systems   HENT: Positive for congestion, sore throat and trouble swallowing. Negative for ear discharge and ear pain.    Respiratory: Negative for shortness of breath.    Gastrointestinal: Negative for vomiting.   Neurological: Negative for headaches.      Objective    /78   Pulse 78   Temp 98  F (36.7  C)   Resp 20   SpO2 98%   There is no height or weight on file to calculate BMI.  Physical Exam  HENT:      Head: Atraumatic.      Right Ear: There is impacted cerumen.      Left Ear: There is impacted cerumen.      Nose:  Congestion and rhinorrhea present.      Right Turbinates: Enlarged and swollen.      Left Turbinates: Enlarged and swollen.      Mouth/Throat:      Mouth: Mucous membranes are moist.      Pharynx: Uvula midline. Oropharyngeal exudate and posterior oropharyngeal erythema present.      Tonsils: Tonsillar exudate present.   Eyes:      Extraocular Movements: Extraocular movements intact.      Conjunctiva/sclera: Conjunctivae normal.      Pupils: Pupils are equal, round, and reactive to light.   Cardiovascular:      Rate and Rhythm: Normal rate and regular rhythm.      Heart sounds: Normal heart sounds.   Pulmonary:      Effort: Pulmonary effort is normal.      Breath sounds: Normal breath sounds.   Lymphadenopathy:      Cervical: Cervical adenopathy present.   Skin:     General: Skin is dry.   Neurological:      Mental Status: He is alert.

## 2021-06-30 NOTE — PATIENT INSTRUCTIONS
Take Amoxicillin as directed. Take ibuprofen as needed for fever or pain. Gargle with hot salty water. Try lemon tea with honey.   Patient Education     Pharyngitis: Strep (Confirmed)    You have had a positive test for strep throat. Strep throat is a contagious illness. It's spread by coughing, kissing, sharing glasses or eating utensils, or by touching others after touching your mouth or nose. Symptoms include throat pain that is worse with swallowing, aching all over, headache, swollen lymph nodes at the front of the neck, and red swollen tonsils sometimes with white patches and fever. It's treated with antibiotic medicine. This should help you start to feel better in 1 to 2 days.   Home care    Rest at home. Drink plenty of fluids so you won't get dehydrated.    No work or school for the first 2 days of taking the antibiotics. You can then return to school or work if you are feeling better, have been taking the antibiotic for at least 24 hours and don't have a fever.     Take antibiotic medicine for the full 10 days, even if you feel better. This is very important to ensure the infection is treated completely. It's also important to prevent medicine-resistant germs from developing. If you were given an antibiotic shot, you don't need any more antibiotics.    You may use acetaminophen or ibuprofen to control pain or fever, unless another medicine was prescribed for this. Talk with your healthcare provider before taking these medicines if you have chronic liver or kidney disease or if you have had a stomach ulcer or gastrointestinal bleeding.    Throat lozenges or sprays help reduce pain. Gargling with warm saltwater will also reduce throat pain. Dissolve 1/2 teaspoon of salt in 1 glass of warm water. This may be useful just before meals.     Soft foods and cool or warm fluids are best. Don't eat salty or spicy foods.    Follow-up care  Follow up with your healthcare provider or our staff if you don't get better  over the next week.   When to get medical advice  Call your healthcare provider right away or get immediate medical care if any of these occur:     Fever of 100.4 F (38 C) or higher, or as directed by your healthcare provider    New or worsening ear pain, sinus pain, or headache    Painful lumps in the back of neck    Stiff neck    Lymph nodes getting larger or becoming soft in the middle    You have trouble swallowing liquids or you can't open your mouth wide because of throat pain    Signs of dehydration. These include very dark urine or no urine, sunken eyes, and dizziness.    Noisy breathing    Muffled voice    Rash  Call 911  Call 911right away if you:     Have trouble breathing    Can't swallow or talk    Prevention  Here are steps you can take to help prevent an infection:     Wash your hands often with soap and clean, running water for at least 20 seconds.    Don t have close contact with people who have sore throats, colds, or other upper respiratory infections.    Don t smoke, and stay away from secondhand smoke.  [employer] last reviewed this educational content on 3/1/2020    7687-7205 [employer]. All rights reserved. This information is not intended as a substitute for professional medical care. Always follow your healthcare professional's instructions.

## 2021-09-04 ENCOUNTER — HEALTH MAINTENANCE LETTER (OUTPATIENT)
Age: 31
End: 2021-09-04

## 2022-01-24 DIAGNOSIS — G43.109 MIGRAINE WITH AURA AND WITHOUT STATUS MIGRAINOSUS, NOT INTRACTABLE: Primary | ICD-10-CM

## 2022-01-24 NOTE — TELEPHONE ENCOUNTER
Reason for Call:  Patient is calling for a refill of    AIMOVIG 140 MG/ML injection    SEND TO [facility]    Detailed comments: LAST SEEN 12/2020. Has appointment scheduled for 225/21, would appreciate a bridge of medication until seen.    Phone Number Patient can be reached at: Home number on file [phone] (home)    Best Time: any    Can we leave a detailed message on this number? YES    Call taken on 1/24/2022 at 1:49 PM by Rossy Dotson

## 2022-01-25 RX ORDER — ERENUMAB-AOOE 140 MG/ML
140 INJECTION, SOLUTION SUBCUTANEOUS
Qty: 1 ML | Refills: 11 | Status: SHIPPED | OUTPATIENT
Start: 2022-01-25 | End: 2023-02-20

## 2022-01-31 ENCOUNTER — TELEPHONE (OUTPATIENT)
Dept: FAMILY MEDICINE | Facility: CLINIC | Age: 32
End: 2022-01-31

## 2022-01-31 NOTE — TELEPHONE ENCOUNTER
Reason for Call:  Other call back    Detailed comments: Pt stating a PA is needed for his: Aimovig    Please advise    Phone Number Patient can be reached at: Cell number on file:    Telephone Information:   Mobile [phone]       Best Time: anytime    Can we leave a detailed message on this number? YES    Call taken on 1/31/2022 at 9:13 AM by Gisel Luciano

## 2022-02-04 NOTE — TELEPHONE ENCOUNTER
Central Prior Authorization Team   Phone: [phone]      PA Initiation    Medication: AIMOVIG 140 MG/ML injection  Insurance Company: [employer] - Phone [phone] Fax [phone]  Pharmacy Filling the Rx: [facility] -- [place], [place] - [address]  Filling Pharmacy Phone: [phone]  Filling Pharmacy Fax:    Start Date: 2/4/2022

## 2022-02-04 NOTE — TELEPHONE ENCOUNTER
Prior Authorization Approval    Authorization Effective Date: 1/5/2022  Authorization Expiration Date: 2/4/2023  Medication: AIMOVIG 140 MG/ML injection-APPROVED   Approved Dose/Quantity: 1 ml every 30 days  Reference #: CASE ID [id number]   Insurance Company: [employer] - Phone [phone] Fax [phone]  Expected CoPay:       CoPay Card Available: No    Foundation Assistance Needed:    Which Pharmacy is filling the prescription (Not needed for infusion/clinic administered): [facility] -- [place], [place] - [address]  Pharmacy Notified: Yes  Patient Notified: Yes      Central Prior Authorization Team   Phone: [phone]

## 2022-02-19 ENCOUNTER — HEALTH MAINTENANCE LETTER (OUTPATIENT)
Age: 32
End: 2022-02-19

## 2022-04-04 DIAGNOSIS — G43.109 MIGRAINE WITH AURA AND WITHOUT STATUS MIGRAINOSUS, NOT INTRACTABLE: Primary | ICD-10-CM

## 2022-04-05 NOTE — TELEPHONE ENCOUNTER
"Routing refill request to provider for review/approval because:  Patient needs to be seen because it has been more than 1 year since last office visit.    Last Written Prescription Date:  5/25/2021  Last Fill Quantity: 18,  # refills: 8   Last office visit provider:  12/18/2020 Dr. Moss     SUMAtriptan (IMITREX) 100 MG tablet 18 tablet 8 5/25/2021  No   Sig: TAKE ONE TABLET BY MOUTH EVERY 2 HOURS AS NEEDED FOR MIGRAINE, max 2 tablets/24 hours   Sent to pharmacy as: SUMAtriptan 100 mg tablet (IMITREX)   E-Prescribing Status: Receipt confirmed by pharmacy (5/25/2021  8:09 AM CDT         Requested Prescriptions   Pending Prescriptions Disp Refills     SUMAtriptan (IMITREX) 100 MG tablet       Sig: Take 1 tablet (100 mg) by mouth at onset of headache for migraine       Serotonin Agonists Failed - 4/4/2022  9:37 AM        Failed - Serotonin Agonist request needs review.     Please review patient's record. If patient has had 8 or more treatments in the past month, please forward to provider.          Failed - Recent (12 mo) or future (30 days) visit within the authorizing provider's specialty     Patient has had an office visit with the authorizing provider or a provider within the authorizing providers department within the previous 12 mos or has a future within next 30 days. See \"Patient Info\" tab in inbasket, or \"Choose Columns\" in Meds & Orders section of the refill encounter.              Passed - Blood pressure under 140/90 in past 12 months     BP Readings from Last 3 Encounters:   06/29/21 120/78   12/18/20 100/60   06/18/20 126/84                 Passed - Medication is active on med list        Passed - Patient is age 18 or older             Susu Obando RN 04/05/22 12:16 PM  "

## 2022-04-06 RX ORDER — SUMATRIPTAN 100 MG/1
100 TABLET, FILM COATED ORAL
Qty: 18 TABLET | Refills: 3 | Status: SHIPPED | OUTPATIENT
Start: 2022-04-06 | End: 2022-06-24

## 2022-06-24 ENCOUNTER — OFFICE VISIT (OUTPATIENT)
Dept: FAMILY MEDICINE | Facility: CLINIC | Age: 32
End: 2022-06-24
Payer: COMMERCIAL

## 2022-06-24 VITALS
OXYGEN SATURATION: 98 % | BODY MASS INDEX: 17.04 KG/M2 | DIASTOLIC BLOOD PRESSURE: 77 MMHG | HEIGHT: 66 IN | SYSTOLIC BLOOD PRESSURE: 115 MMHG | HEART RATE: 88 BPM | TEMPERATURE: 97.5 F | WEIGHT: 106 LBS

## 2022-06-24 DIAGNOSIS — Z20.9 EXPOSURE TO POTENTIAL INFECTION: Primary | ICD-10-CM

## 2022-06-24 DIAGNOSIS — G43.109 MIGRAINE WITH AURA AND WITHOUT STATUS MIGRAINOSUS, NOT INTRACTABLE: ICD-10-CM

## 2022-06-24 PROCEDURE — 91305 COVID-19,PF,PFIZER (12+ YRS): CPT | Performed by: FAMILY MEDICINE

## 2022-06-24 PROCEDURE — 0054A COVID-19,PF,PFIZER (12+ YRS): CPT | Performed by: FAMILY MEDICINE

## 2022-06-24 PROCEDURE — 90471 IMMUNIZATION ADMIN: CPT | Performed by: FAMILY MEDICINE

## 2022-06-24 PROCEDURE — 99395 PREV VISIT EST AGE 18-39: CPT | Mod: 25 | Performed by: FAMILY MEDICINE

## 2022-06-24 PROCEDURE — 90632 HEPA VACCINE ADULT IM: CPT | Performed by: FAMILY MEDICINE

## 2022-06-24 RX ORDER — SUMATRIPTAN 100 MG/1
100 TABLET, FILM COATED ORAL
Qty: 18 TABLET | Refills: 11 | Status: SHIPPED | OUTPATIENT
Start: 2022-06-24

## 2022-06-24 RX ORDER — MEFLOQUINE HYDROCHLORIDE 250 MG/1
250 TABLET ORAL
Qty: 6 TABLET | Refills: 0 | Status: SHIPPED | OUTPATIENT
Start: 2022-06-24 | End: 2022-07-30

## 2022-06-24 ASSESSMENT — ENCOUNTER SYMPTOMS
NAUSEA: 0
PALPITATIONS: 0
HEADACHES: 1
JOINT SWELLING: 0
ARTHRALGIAS: 0
SHORTNESS OF BREATH: 0
FREQUENCY: 0
MYALGIAS: 0
DIZZINESS: 0
NERVOUS/ANXIOUS: 0
CONSTIPATION: 0
CHILLS: 0
DIARRHEA: 0
PARESTHESIAS: 0
ABDOMINAL PAIN: 0
FEVER: 0
EYE PAIN: 0
COUGH: 0
SORE THROAT: 0
HEMATOCHEZIA: 0
HEMATURIA: 0
DYSURIA: 0
HEARTBURN: 0
WEAKNESS: 0

## 2022-06-24 NOTE — PATIENT INSTRUCTIONS
Next Shots   After 12/24/2022   Typhoid  Hep A#2       Mefloquine   250 mg   Start 1 week before leaving then weekly until gone 6 weeks      CDC.gov [place] travel       Certify for Med Cannabis for Migraine   Level 8/10   Today 1       Actively Pursue Wellness  Keep it balanced Cayetano!      Eat Whole Foods with High Nutritional Value  -----High Fats Nuts, Olive Oil, Fish, Avocados  -----Lean Meats  -----Vegetables Colorful and In abundance  -----Fresh fruits  -----Beans, Legumes  and Whole Grains    Engage in Moderate Exercise  -----30-60 minutes daily    Get Intentional Restorative Sleep  -----8-10 hours    Cultivate and Maintain Healthy Relationships  -----Family and Friends  -----Work Place  -----Community      Remove and Harmful Factors  -----Stressors  Work and Home   -----Toxins:  Tobacco, Alcohol in Excess, Processed Sugars (White Stuff and High Fructose Corn Syrup, Pollutants (Air and Water)      Be Present and Mindful for Yourself and Family  -----Laugh Together  -----Talk Together and Listen to your Body and Family and Friends  -----Enjoy Meals together        There are Five Documented ways to Reduce Risk  for Chronic Illness:    Exercise Daily  30-60 minutes  No Tobacco Products  BMI less than 25  Mediterranean Style Diet     Eat more Plants>> in abundance and of many colors!  Phyto-nutrients activate our health potential.  Choose:  More Colors. More Diversity  Get More Benefit.    Cultivate that inner Compost bin!    Sima
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
## 2022-06-24 NOTE — ASSESSMENT & PLAN NOTE
"Gotten a lot better with Aimovig    Bernice     Aimovig \"huge\"    3 x times / week   Now 1 x week     Sumatriptan for abortive     Interested in Med Cannabis  Today 1/10   Usually 7-8/10 with exacerbations   "

## 2022-10-22 ENCOUNTER — HEALTH MAINTENANCE LETTER (OUTPATIENT)
Age: 32
End: 2022-10-22

## 2023-01-13 ENCOUNTER — ALLIED HEALTH/NURSE VISIT (OUTPATIENT)
Dept: FAMILY MEDICINE | Facility: CLINIC | Age: 33
End: 2023-01-13
Payer: COMMERCIAL

## 2023-01-13 DIAGNOSIS — Z23 ENCOUNTER FOR IMMUNIZATION: Primary | ICD-10-CM

## 2023-01-13 PROCEDURE — 90632 HEPA VACCINE ADULT IM: CPT

## 2023-01-13 PROCEDURE — 90471 IMMUNIZATION ADMIN: CPT

## 2023-01-13 PROCEDURE — 99207 PR NO CHARGE NURSE ONLY: CPT

## 2023-02-17 DIAGNOSIS — G43.109 MIGRAINE WITH AURA AND WITHOUT STATUS MIGRAINOSUS, NOT INTRACTABLE: ICD-10-CM

## 2023-02-18 NOTE — TELEPHONE ENCOUNTER
Former patient of Ajay & has not established care with another provider.  Please assign refill request to covering provider per clinic standard process.    Routing refill request to provider for review/approval because:  Drug not on the [facility] refill protocol     Last Written Prescription Date:  1/25/22  Last Fill Quantity: 1,  # refills: 11   Last office visit provider:  6/24/22     Requested Prescriptions   Pending Prescriptions Disp Refills     AIMOVIG 140 MG/ML injection 1 mL 11     Sig: Inject 1 mL (140 mg) Subcutaneous every 30 days       There is no refill protocol information for this order          Jadyn Jasmine, RN 02/18/23 5:22 PM

## 2023-02-20 RX ORDER — ERENUMAB-AOOE 140 MG/ML
140 INJECTION, SOLUTION SUBCUTANEOUS
Qty: 1 ML | Refills: 11 | Status: SHIPPED | OUTPATIENT
Start: 2023-02-20

## 2023-02-21 ENCOUNTER — TELEPHONE (OUTPATIENT)
Dept: INTERNAL MEDICINE | Facility: CLINIC | Age: 33
End: 2023-02-21
Payer: COMMERCIAL

## 2023-02-23 ENCOUNTER — TELEPHONE (OUTPATIENT)
Dept: INTERNAL MEDICINE | Facility: CLINIC | Age: 33
End: 2023-02-23
Payer: COMMERCIAL

## 2023-02-23 NOTE — TELEPHONE ENCOUNTER
Central Prior Authorization Team   Phone: [phone]      PA Initiation    Medication: Aimovig  Insurance Company: [employer] - Phone [phone] Fax [phone]  Pharmacy Filling the Rx: [facility] -- [place], [place] - [address]  Filling Pharmacy Phone: [phone]  Filling Pharmacy Fax:    Start Date: 2/23/2023

## 2023-02-23 NOTE — TELEPHONE ENCOUNTER
Prior Authorization Approval    Authorization Effective Date: 1/24/2023  Authorization Expiration Date: 2/23/2024  Medication: Aimovig-APPROVED  Approved Dose/Quantity:   Reference #:     Insurance Company: [employer] - Phone [phone] Fax [phone]  Expected CoPay:       CoPay Card Available:      Foundation Assistance Needed:    Which Pharmacy is filling the prescription (Not needed for infusion/clinic administered): [facility] -- [place] - [place], [place] - [address]  Pharmacy Notified: Yes  Patient Notified: No  **Instructed pharmacy to notify patient when script is ready to /ship.**

## 2023-08-27 ENCOUNTER — HEALTH MAINTENANCE LETTER (OUTPATIENT)
Age: 33
End: 2023-08-27

## 2024-10-04 ENCOUNTER — TRANSFERRED RECORDS (OUTPATIENT)
Dept: HEALTH INFORMATION MANAGEMENT | Facility: CLINIC | Age: 34
End: 2024-10-04
Payer: COMMERCIAL

## 2024-10-07 ENCOUNTER — MEDICAL CORRESPONDENCE (OUTPATIENT)
Dept: HEALTH INFORMATION MANAGEMENT | Facility: CLINIC | Age: 34
End: 2024-10-07
Payer: COMMERCIAL

## 2024-10-08 ENCOUNTER — TRANSCRIBE ORDERS (OUTPATIENT)
Dept: OTHER | Age: 34
End: 2024-10-08

## 2024-10-08 DIAGNOSIS — M54.50 CHRONIC LOW BACK PAIN: Primary | ICD-10-CM

## 2024-10-08 DIAGNOSIS — M41.9 KYPHOSCOLIOSIS: ICD-10-CM

## 2024-10-08 DIAGNOSIS — G89.29 CHRONIC LOW BACK PAIN: Primary | ICD-10-CM

## 2024-10-09 ENCOUNTER — PATIENT OUTREACH (OUTPATIENT)
Dept: CARE COORDINATION | Facility: CLINIC | Age: 34
End: 2024-10-09
Payer: COMMERCIAL

## 2024-10-11 ENCOUNTER — PATIENT OUTREACH (OUTPATIENT)
Dept: CARE COORDINATION | Facility: CLINIC | Age: 34
End: 2024-10-11
Payer: COMMERCIAL

## 2024-10-19 ENCOUNTER — HEALTH MAINTENANCE LETTER (OUTPATIENT)
Age: 34
End: 2024-10-19

## 2025-02-19 ENCOUNTER — TRANSFERRED RECORDS (OUTPATIENT)
Dept: HEALTH INFORMATION MANAGEMENT | Facility: CLINIC | Age: 35
End: 2025-02-19

## 2025-03-18 ENCOUNTER — TELEPHONE (OUTPATIENT)
Dept: PHARMACY | Facility: PHYSICIAN GROUP | Age: 35
End: 2025-03-18
Payer: COMMERCIAL

## 2025-03-18 NOTE — TELEPHONE ENCOUNTER
Patient have appointment schedule with pharmacist on 4/23 for Emgality education. Patient have questions about mediation and also wondering if provider can fit him in earlier than 4/23. Patient requesting call back from pharmacist. Thank you.    Team - can you look and see if one of my Sne[facility] colleagues has a sooner appointment? Then please contact patient to schedule. Thank you!    Rubina Johnson, Pharm.D., [facility]  Medication Therapy Management Pharmacist  [phone]

## 2025-05-01 NOTE — CONFIDENTIAL NOTE
NEUROSURGERY - NEW PREVISIT PLANNING    Referring Provider: Jose Antonio Moss MD    OVN 10/04/2024 encounter (ENTIRA) click link to progress note   Reason For Visit: M54.50, G89.29 (ICD-10-CM) - Chronic low back pain   M41.9 (ICD-10-CM) - Kyphoscoliosis          IMAGING STATUS/LOCATION DATE/TYPE   MRI N/A    CT N/A    XRAY N/A    NOTES STATUS/LOCATION DATE/TYPE   Other specialist OVN: N/A    EMG N/A    INJECTION N/A    PHYSICAL THERAPY CE 2013   SURGERY *patient reported 8 prior spine surgeries as a child thru [facility]. Patient will contact them to complete an ULISES, a fax request has been sent as well.     Does patient have C2C?  Year last updated Action     YES   []      Please update at appointment if outdated more than 5 years       NO     [x]   N/A   Please complete C2C at appointment

## 2025-05-01 NOTE — CONFIDENTIAL NOTE
Called patient 5/1:    Patient has direct referral to Dr. Fall but no imaging in chart. Received the following information:    Patient does not have updated imaging. He says his last imaging may have been around 2011. Patient has disk and imaging reports of old spine imaging. Writer advised he come to clinic a little early so we can get these scanned in.    Patient stated that as a child, he had around 8 spine surgeries. He believes they were all completed through [facility]. The surgeries consisted of instrumentation and fusions.    Patient is starting to have back pain again and would like to formally be checked out. He does not have major concerns but would like to see what he can be done to help with the pain. Patient would prefer to see Dr. Fall, but writer explained that MD's need an MRI completed within the past year. Writer explained patient is scheduled with EL who can determine if any/what kind of imaging is needed for further steps.     Patient inquired about completing imaging outside of [place]. Writer advised that if the provider orders imaging, we can send these orders to outside facilities (gave example of [facility]).     Writer also encouraged patient to contact [facility], it is likely they do not have an updated Release of Information in order to send us records. Patient will reach out to get this completed.     Action [facility] spine [facility]   Action Taken Patient reported that they had several spine surgeries (~8 surgeries) as a child through [facility], please fax all operative notes of prior spine surgeries. Please also push any prior MRI, CT, or XR imaging of spine and please fax reports.    Status: 5/1, request sent.

## 2025-05-06 ENCOUNTER — OFFICE VISIT (OUTPATIENT)
Dept: NEUROSURGERY | Facility: CLINIC | Age: 35
End: 2025-05-06
Payer: COMMERCIAL

## 2025-05-06 ENCOUNTER — PRE VISIT (OUTPATIENT)
Dept: NEUROSURGERY | Facility: CLINIC | Age: 35
End: 2025-05-06

## 2025-05-06 VITALS
DIASTOLIC BLOOD PRESSURE: 79 MMHG | HEART RATE: 93 BPM | WEIGHT: 110 LBS | SYSTOLIC BLOOD PRESSURE: 121 MMHG | HEIGHT: 69 IN | OXYGEN SATURATION: 98 % | BODY MASS INDEX: 16.29 KG/M2

## 2025-05-06 DIAGNOSIS — M54.50 ACUTE ON CHRONIC LOW BACK PAIN: ICD-10-CM

## 2025-05-06 DIAGNOSIS — G95.0 SYRINGOMYELIA (H): Primary | ICD-10-CM

## 2025-05-06 DIAGNOSIS — M41.9 KYPHOSCOLIOSIS: ICD-10-CM

## 2025-05-06 DIAGNOSIS — G89.29 ACUTE ON CHRONIC LOW BACK PAIN: ICD-10-CM

## 2025-05-06 DIAGNOSIS — M41.35 THORACOGENIC SCOLIOSIS OF THORACOLUMBAR REGION: ICD-10-CM

## 2025-05-06 PROCEDURE — 3074F SYST BP LT 130 MM HG: CPT

## 2025-05-06 PROCEDURE — 1126F AMNT PAIN NOTED NONE PRSNT: CPT

## 2025-05-06 PROCEDURE — 99203 OFFICE O/P NEW LOW 30 MIN: CPT

## 2025-05-06 PROCEDURE — 3078F DIAST BP <80 MM HG: CPT

## 2025-05-06 ASSESSMENT — PAIN SCALES - GENERAL: PAINLEVEL_OUTOF10: NO PAIN (0)

## 2025-05-06 NOTE — NURSING NOTE
"Cayetano Agrawal is a 34 year old male who presents for:  Chief Complaint   Patient presents with    Consult     Chronic low back pain, kyphoscoliosis. Patient reports no pain currently, but 4 at worst. No numbness/tingling. Patient may have had surgeries done at [facility] as a child and will try to get more info.        Initial Vitals:  /79   Pulse 93   Ht 5' 9\" (1.753 m)   Wt 110 lb (49.9 kg)   SpO2 98%   BMI 16.24 kg/m   Estimated body mass index is 16.24 kg/m  as calculated from the following:    Height as of this encounter: 5' 9\" (1.753 m).    Weight as of this encounter: 110 lb (49.9 kg). Body surface area is 1.56 meters squared. BP completed using cuff size: regular  No Pain (0)        Dakotah Hedrick    "

## 2025-05-06 NOTE — LETTER
5/6/2025      Cayetano Agrawal  [address] [place] [address]      Dear Colleague,    Thank you for referring your patient, Cayetano Agrawal, to the [facility]. Please see a copy of my visit note below.    [facility] Neurosurgery  Neurosurgery Clinic Visit      CC: Chronic back pain     Primary care Provider: Jose Antonio Moss    Reason For Visit:   I was asked by Jose Antonio Moss MD  to consult on the patient for chronic low back pain.    HPI: Cayetano Agrawal is a 34 year old male history of scoliosis and syringomyelia referred to NSGY clinic today for evaluation of intermittent chronic back pain.     Patient states he has had multiple back surgeries all over 20 years ago. States he had surgery to attempt to remove cysts due to his syringomyelia that was aborted due to risk and multiple surgeries for his scoliosis, most recent surgery was over 20 years ago and was a spinal fusion per patient. Most recent imaging is from 2013, unfortunately only a cervical and brain MRI are available for review at time of clinic visit. Patient describes intermittent low back pain primarily over his left lateral lumbar spine/hip, exacerbated with activity. Patient is highly active - does 12 hour bike rides. Denies upper or lower extremity radicular pain/paresthesias/weakness, acute bowel or bladder dysfunction, saddle anesthesia, recent fall/trauma.      Past Medical History:   Diagnosis Date     Syringomyelia (H)        Past Medical History reviewed with patient during visit.    Past Surgical History:   Procedure Laterality Date     CERVICAL FUSION      [facility] spine     SPINAL GROWTH RODS       TONSILLECTOMY       Past Surgical History reviewed with patient during visit.    Current Outpatient Medications   Medication Sig Dispense Refill     AIMOVIG 140 MG/ML injection Inject 1 mL (140 mg) Subcutaneous every 30 days (Patient not taking: Reported on 3/28/2025) 1 mL 11      "SUMAtriptan (IMITREX) 100 MG tablet Take 1 tablet (100 mg) by mouth at onset of headache for migraine Take 100 mg by mouth at onset of headache for migraine 18 tablet 11     No current facility-administered medications for this visit.       Allergies   Allergen Reactions     Topiramate Unknown     Poor memory, small headaches      Zonisamide Unknown       Social History     Socioeconomic History     Marital status: Single   Tobacco Use     Smoking status: Never     Smokeless tobacco: Never   Substance and Sexual Activity     Alcohol use: Yes     Alcohol/week: 2.0 standard drinks of alcohol     Drug use: Yes     Frequency: 2.0 times per week     Types: Marijuana     Sexual activity: Yes     Partners: Female     Birth control/protection: I.U.D.       Family History   Problem Relation Age of Onset     Alcoholism Father          ROS: 10 point ROS neg other than the symptoms noted above in the HPI.    Vital Signs:   /79   Pulse 93   Ht 5' 9\" (1.753 m)   Wt 110 lb (49.9 kg)   SpO2 98%   BMI 16.24 kg/m        Examination:  Constitutional:  Alert, well nourished, NAD.  Memory: recent and remote memory   HEENT: Normocephalic, atraumatic.   Pulm:  Without shortness of breath   CV:  No pitting edema of BLE.      Neurological:  Awake  Alert  Oriented x 3  Speech clear    Motor exam:   Shoulder Abduction:   Right:  5/5   Left:  5/5  Biceps:                        Right:  5/5   Left:  5/5  Triceps:                       Right:  5/5   Left:  5/5  Wrist Extensors:          Right:  5/5   Left:  5/5  Wrist Flexors:              Right:  5/5   Left:  5/5  Intrinsics:                     Right:  5/5   Left:  5/5  Hip Flexion:                 Right: 5/5  Left:  5/5  Knee Flexion:              Right:  5/5  Left:  5/5  Knee Extension:          Right:  5/5  Left:  5/5  Plantar Flexion:           Right:  5/5  Left:  5/5  Dorsal Flexion:            Right:  5/5  Left:  5/5  EHL:                            Right:  5/5  Left:  " 5/5     Thoracolumbar scoliosis  Left scapula higher than right scapula  Well healed occiput to sacral midline incision  Well healed left chest wall incision  Nontender over cervical/thoracic/lumbar spine  Negative Ellis's bilaterally  Sensation intact BUE/BLE    Imaging:   No thoracolumbar imaging available for review at time of clinic visit.     Assessment/Plan:   34 year old male history of scoliosis and syringomyelia referred to NSGY clinic today for evaluation of intermittent chronic back pain.     Patient states he has had multiple back surgeries all over 20 years ago. States he had surgery to attempt to remove cysts due to his syringomyelia that was aborted due to risk and multiple surgeries for his scoliosis, most recent surgery was over 20 years ago and was a spinal fusion per patient. Most recent imaging is from 2013, unfortunately only a cervical and brain MRI are available for review at time of clinic visit. Patient describes intermittent low back pain primarily over his left lateral lumbar spine/hip, exacerbated with activity. Patient is highly active - does 12 hour bike rides. Denies upper or lower extremity radicular pain/paresthesias/weakness, acute bowel or bladder dysfunction, saddle anesthesia, recent fall/trauma.    Plan:   - Scoliosis xray films - referral faxed to [facility]. Advised to contact NSGY clinic if wanting referral faxed to other imaging facility.   - [facility] orthopedics signed   - Referral to [facility] NSGY deformity clinic  - Activity as tolerated     Discussed with Dr. Fall.    Call the clinic at [phone] for any other questions and concerns.      Patient verbalized understanding and is in agreement with the plan.      Rachael Gan PA-C  [facility] Neurosurgery  [place] [address]        Again, thank you for allowing me to participate in the care of your patient.        Sincerely,        Rachael  LEONARD Gan PA-C    Electronically signed

## 2025-05-06 NOTE — PROGRESS NOTES
[facility] Neurosurgery  Neurosurgery Clinic Visit      CC: Chronic back pain     Primary care Provider: Jose Antonio Moss    Reason For Visit:   I was asked by Jose Antonio Moss MD  to consult on the patient for chronic low back pain.    HPI: Cayetano Agrawal is a 34 year old male history of scoliosis and syringomyelia referred to NSGY clinic today for evaluation of intermittent chronic back pain.     Patient states he has had multiple back surgeries all over 20 years ago. States he had surgery to attempt to remove cysts due to his syringomyelia that was aborted due to risk and multiple surgeries for his scoliosis, most recent surgery was over 20 years ago and was a spinal fusion per patient. Most recent imaging is from 2013, unfortunately only a cervical and brain MRI are available for review at time of clinic visit. Patient describes intermittent low back pain primarily over his left lateral lumbar spine/hip, exacerbated with activity. Patient is highly active - does 12 hour bike rides. Denies upper or lower extremity radicular pain/paresthesias/weakness, acute bowel or bladder dysfunction, saddle anesthesia, recent fall/trauma.      Past Medical History:   Diagnosis Date    Syringomyelia (H)        Past Medical History reviewed with patient during visit.    Past Surgical History:   Procedure Laterality Date    CERVICAL FUSION      [facility] spine    SPINAL GROWTH RODS      TONSILLECTOMY       Past Surgical History reviewed with patient during visit.    Current Outpatient Medications   Medication Sig Dispense Refill    AIMOVIG 140 MG/ML injection Inject 1 mL (140 mg) Subcutaneous every 30 days (Patient not taking: Reported on 3/28/2025) 1 mL 11    SUMAtriptan (IMITREX) 100 MG tablet Take 1 tablet (100 mg) by mouth at onset of headache for migraine Take 100 mg by mouth at onset of headache for migraine 18 tablet 11     No current facility-administered medications for this visit.       Allergies   Allergen  "Reactions    Topiramate Unknown     Poor memory, small headaches     Zonisamide Unknown       Social History     Socioeconomic History    Marital status: Single   Tobacco Use    Smoking status: Never    Smokeless tobacco: Never   Substance and Sexual Activity    Alcohol use: Yes     Alcohol/week: 2.0 standard drinks of alcohol    Drug use: Yes     Frequency: 2.0 times per week     Types: Marijuana    Sexual activity: Yes     Partners: Female     Birth control/protection: I.U.D.       Family History   Problem Relation Age of Onset    Alcoholism Father          ROS: 10 point ROS neg other than the symptoms noted above in the HPI.    Vital Signs:   /79   Pulse 93   Ht 5' 9\" (1.753 m)   Wt 110 lb (49.9 kg)   SpO2 98%   BMI 16.24 kg/m        Examination:  Constitutional:  Alert, well nourished, NAD.  Memory: recent and remote memory   HEENT: Normocephalic, atraumatic.   Pulm:  Without shortness of breath   CV:  No pitting edema of BLE.      Neurological:  Awake  Alert  Oriented x 3  Speech clear    Motor exam:   Shoulder Abduction:   Right:  5/5   Left:  5/5  Biceps:                        Right:  5/5   Left:  5/5  Triceps:                       Right:  5/5   Left:  5/5  Wrist Extensors:          Right:  5/5   Left:  5/5  Wrist Flexors:              Right:  5/5   Left:  5/5  Intrinsics:                     Right:  5/5   Left:  5/5  Hip Flexion:                 Right: 5/5  Left:  5/5  Knee Flexion:              Right:  5/5  Left:  5/5  Knee Extension:          Right:  5/5  Left:  5/5  Plantar Flexion:           Right:  5/5  Left:  5/5  Dorsal Flexion:            Right:  5/5  Left:  5/5  EHL:                            Right:  5/5  Left:  5/5     Thoracolumbar scoliosis  Left scapula higher than right scapula  Well healed occiput to sacral midline incision  Well healed left chest wall incision  Nontender over cervical/thoracic/lumbar spine  Negative Ellis's bilaterally  Sensation intact BUE/BLE    Imaging: "   No thoracolumbar imaging available for review at time of clinic visit.     Assessment/Plan:   34 year old male history of scoliosis and syringomyelia referred to NSGY clinic today for evaluation of intermittent chronic back pain.     Patient states he has had multiple back surgeries all over 20 years ago. States he had surgery to attempt to remove cysts due to his syringomyelia that was aborted due to risk and multiple surgeries for his scoliosis, most recent surgery was over 20 years ago and was a spinal fusion per patient. Most recent imaging is from 2013, unfortunately only a cervical and brain MRI are available for review at time of clinic visit. Patient describes intermittent low back pain primarily over his left lateral lumbar spine/hip, exacerbated with activity. Patient is highly active - does 12 hour bike rides. Denies upper or lower extremity radicular pain/paresthesias/weakness, acute bowel or bladder dysfunction, saddle anesthesia, recent fall/trauma.    Plan:   - Scoliosis xray films - referral faxed to [facility]. Advised to contact NSGY clinic if wanting referral faxed to other imaging facility.   - ULISES for [facility] orthopedics signed   - Referral to [facility] NSGY deformity clinic  - Activity as tolerated     Discussed with Dr. Fall.    Call the clinic at [phone] for any other questions and concerns.      Patient verbalized understanding and is in agreement with the plan.      Rachael Gan PA-C  [facility] Neurosurgery  [address] [address]

## 2025-05-06 NOTE — PATIENT INSTRUCTIONS
- Scoliosis films ordered today. Will fax referral to [facility] and provide hard copy referral.    - Will have Mary my nurse contact you regarding out of pocket imaging pricing.    - Referral place to [facility] neurosurgery team for appointment to discuss scoliosis     - Recommend alternating heat/ice, massage, light stretching, topical pain gels (biofreeze, icy hot, tiger balm).    -Please contact neurosurgery clinic via MyChart or telephone [phone] for questions, concerns, new or worsening symptoms.

## 2025-05-07 ENCOUNTER — TELEPHONE (OUTPATIENT)
Dept: NEUROSURGERY | Facility: CLINIC | Age: 35
End: 2025-05-07
Payer: COMMERCIAL

## 2025-05-07 NOTE — TELEPHONE ENCOUNTER
Writer spoke with patient to discuss affordable advanced imaging.   I don't know of a place for cheap CTs but I do have one for MRIs. It's call [facility] in [place]. Patient would be interested in obtaining imaging here in the future if he should need an MRI w/o contrast. The next step is to obtain an x-ray.    Mary Landa RN on 5/7/2025 at 11:12 AM

## 2025-05-16 ENCOUNTER — ANCILLARY PROCEDURE (OUTPATIENT)
Dept: GENERAL RADIOLOGY | Facility: CLINIC | Age: 35
End: 2025-05-16
Payer: COMMERCIAL

## 2025-05-16 DIAGNOSIS — M41.35 THORACOGENIC SCOLIOSIS OF THORACOLUMBAR REGION: ICD-10-CM

## 2025-05-16 DIAGNOSIS — G89.29 ACUTE ON CHRONIC LOW BACK PAIN: ICD-10-CM

## 2025-05-16 DIAGNOSIS — M54.50 ACUTE ON CHRONIC LOW BACK PAIN: ICD-10-CM

## 2025-05-16 PROCEDURE — 72083 X-RAY EXAM ENTIRE SPI 4/5 VW: CPT | Performed by: STUDENT IN AN ORGANIZED HEALTH CARE EDUCATION/TRAINING PROGRAM

## 2025-06-19 NOTE — TELEPHONE ENCOUNTER
DIAGNOSIS:    APPOINTMENT DATE: 07/24/2025   NOTES STATUS DETAILS   OFFICE NOTE from referring provider Internal 05/06/2025 - Rachael Gan PA-C - [facility] Neurological Surgery   OFFICE NOTE from other specialist Media [facility]     OPERATIVE REPORT Patient stated that as a child, he had around 8 spine surgeries. He believes they were all completed through [facility]. The surgeries consisted of Kelly Rods instrumentation and fusions.   NOT AVAIABLE:  Per 12/13/2001 Note by Ivanna Alexandre in Care Everywhere -   Previous surgery includes  1990 a cervical surgery with an attempt to place windows for cerebellar tonsils but when they were in to do the surgery they decided against  placement of the windows.    MEDIA TAB:  04/23/1997 -  ANTERIOR SPINAL FUSION T7 TO L1, WITH RIB AUTOGRAFT AND 7TH RIB APPROACH.     04/23/1997 -  POSTERIOR  SPINAL RECONSTRUCTION, T2 TO L2, WITH RIGHT ILIAC CREST BONE GRAFT. COTREL-DUBOUSSET (C-D) INSTRUMENTATION. OSTEOTOMY, THORACIC SPINE.  UNILATERAL; ARTHRECTOMY, THORACIC SPINE.     MRI PACS/NIL READ [place]:  02/19/2025 - Pelvis    [facility] Radiology:  10/27/2012 - C Spine  10/27/2012 - Head  01/09/1997 - L Spine (Imaging N/A, report in Media Tab)   CT SCAN  Internal:  12/08/2011, 12/05/2011 - Soft Tissue Neck   XRAYS (IMAGES & REPORTS)  Internal    [place]:  02/11/2025 - Pelvis

## 2025-07-17 DIAGNOSIS — M41.20 IDIOPATHIC SCOLIOSIS AND KYPHOSCOLIOSIS: ICD-10-CM

## 2025-07-17 DIAGNOSIS — M41.9 SCOLIOSIS, UNSPECIFIED SCOLIOSIS TYPE, UNSPECIFIED SPINAL REGION: ICD-10-CM

## 2025-07-17 DIAGNOSIS — G95.0 SYRINGOMYELIA AND SYRINGOBULBIA (H): Primary | ICD-10-CM

## 2025-07-17 NOTE — PROGRESS NOTES
In-Person Visit    Spine Surgical History:  ??? - Attempted decompression of cervical syrinx which was aborted intra-op.   04/23/1997 - Part 1: Anterior release and interbody fusion T7-L1 with rib autograft (Dr. Jerome Lynn/Dr. Estrada; [facility]).  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  [Implants: Cotrel-Dubousset (CD) instrumentation]..   2000/2001? - Removal of posterior spinal instrumentation.      REASON FOR CONSULTATION: Consult (Scoloiosis and Synringiomylia / Rachael Gan PA-C in [facility] NEUROSURGERY  Imaging ON FILE)     REFERRING PHYSICIAN: Rachael Gan  PCP:Jose Antonio Moss    History of Present Illness:  35 year old male, referral from Rachael Gan PA-C for history of scoloiosis and syringomyelia.      Patient presents today for evaluation of his spine.  Patient states that he was diagnosed with a cervical syrinx and scoliosis as a young child.  He initially underwent surgery for decompression of his cervical syrinx but this was aborted intra-op by the surgeons given that it was too risky and that he was neurologically intact.  Additionally, the patient states that his parents were Jehovah's Witnesses which could have impacted this decision.  The patient does not have a great relationship with his mother and therefore is unclear of some of his history as a young child.  He states that he then underwent a fusion for his congenital scoliosis in 1997 and had the hardware removed 3 to 4 years later.  Patient states that this was performed at [facility] in [place].    Patient is currently asymptomatic.  He presents to establish care and to obtain reassurance regarding his imaging.  He ultimately never had his syrinx treated.  He is a very active gentleman and states that he frequently performs 12-hour races which involves biking,  canoeing, etc.  He states that he is asymptomatic while performing his activities but occasionally has some low back pain at the end of the day which resolves once he goes to sleep.  He denies any radicular pain into the upper or lower extremities.  He denies bowel or bladder incontinence.  He is able to ambulate without assistive devices.  He denies any other major medical problems but states that he intermittently gets migraine headaches.  He works as a  and does not smoke    Worse: After performing 12-hour multisport races  Better: Rest    Previous treatment:   Denies physical therapy, injections, medications    Previous Injections:  None    Oswestry (BENITA) Questionnaire        7/19/2025     9:39 AM   OSWESTRY DISABILITY INDEX   Count 10    Sum 1    Oswestry Score (%) 2 %        Patient-reported      BENITA  7/24/25: 2%       PROMIS-10 Scores  Global Mental Health Score: (Patient-Rptd) (P) 18  Global Physical Health Score: (Patient-Rptd) (P) 17  PROMIS TOTAL - SUBSCORES: (Patient-Rptd) (P) 35    ROS:  A 12-point review of systems was completed and is negative except for otherwise noted above in the history of present illness.    Med Hx:  Past Medical History:   Diagnosis Date    Syringomyelia (H)        Surg Hx:  Past Surgical History:   Procedure Laterality Date    CERVICAL FUSION      [facility] spine    SPINAL GROWTH RODS      TONSILLECTOMY         Allergies:  Allergies   Allergen Reactions    Topiramate Unknown     Poor memory, small headaches     Zonisamide Unknown       Meds:  Current Outpatient Medications   Medication Sig Dispense Refill    AIMOVIG 140 MG/ML injection Inject 1 mL (140 mg) Subcutaneous every 30 days (Patient not taking: Reported on 3/28/2025) 1 mL 11    SUMAtriptan (IMITREX) 100 MG tablet Take 1 tablet (100 mg) by mouth at onset of headache for migraine Take 100 mg by mouth at onset of headache for migraine 18 tablet 11     No current facility-administered medications for this visit.        Fam Hx:  Family History   Problem Relation Age of Onset    Alcoholism Father        P/S Hx:  Social History     Tobacco Use    Smoking status: Never    Smokeless tobacco: Never   Substance Use Topics    Alcohol use: Yes     Alcohol/week: 2.0 standard drinks of alcohol       Physical Exam:  Very pleasant, healthy appearing, alert, oriented x 3, cooperative.  Normal mood and affect.  Not in cardiorespiratory distress.  There were no vitals taken for this visit.  Stands with a positive sagittal balance and slight left shift  Normal gait without assistive device.  No antalgia / imbalance.    Left rib hump with forward bending  Prior surgical incisions well-healed without erythema or drainage  Tenderness: (-) midline, (-) paraspinal, (-) R and L PSIS.  ROM: Limited flexion, extension, rotation of the spine secondary to prior fusion    Neuro Exam:  Motor: 5/5 strength bilateral upper and lower extremities  Sensory: Sensation intact to light touch bilateral upper and lower extremities  Reflexes: 2+ biceps, triceps, patella, Achilles bilaterally  Negative Jason's bilaterally  Negative clonus  Negative inverted radial reflex    Lower Extremity:  Equal leg lengths, full pulses, (-) atrophy / asymmetry.  Full painless passive knee and ankle motion.  Straight leg raise: (-) right, (-) left.      Imaging:    X-rays of the spine performed on 5/16/2025 demonstrates a left thoracolumbar scoliosis measuring approximately 49 degrees from T6-L3.  The posterior fusion mass appears to extend down to L3 and into the lower cervical spine    MRI of the cervical spine performed in 2012 demonstrates fusion of C2 and C3 along with an intermittent syrinx from C4-C6.  Additionally appears that the posterior fusion mass extends up to the mid cervical spine    Assessment:    35/m with:  1.  Cervical syringomyelia s/p attempted operative decompression around 1990 which was aborted intra-op   2/  History of prior T2-L2 instrumented fusion  (Dr. Jerome Lynn; [facility]) in 1997 for kyphoscoliosis associated with cervical syrinx and congenital block vertebrae; followed by removal of instrumentation in 2000/2001 for congenital kyphoscoliosis  3/  Intermittent back pain with prolonged physical activity, otherwise asymptomatic    Plan:    Reviewed the diagnosis and imaging with the patient in depth.  We are reassured to hear that he is functioning well and is very active.  Additionally, he is largely asymptomatic with a normal physical exam.  We discussed the natural history of a cervical syrinx and stated that it could have decreased in size.  However, we would like to obtain a new MRI of the cervical and thoracic spine to evaluate the size of the cervical syrinx and rule out a thoracic syrinx.  We would additionally like to obtain a CT of the cervical, thoracic, lumbar spine to evaluate the extent of the prior fusion mass.  We discussed with the patient that if the cervical syrinx has increased in size then we will likely refer him to neurosurgery for further evaluation.  We will additionally work on obtaining records and imaging from the [facility] spine center along with the [facility] in [place].  Plan to see the patient back for a virtual visit after the images have been obtained.  Patient understands and agrees with this plan.  All questions answered to his satisfaction    - MRI of the cervical and thoracic spine to evaluate the size of the cervical syrinx and rule out a thoracic syrinx  - CT of the cervical, thoracic, lumbar spine to evaluate the extent of the prior fusion mass  - Retrieve records and imaging from [facility] [place]  - Retrieve records and imaging from [facility]    Sanjiv Fischer, DO  Spine Fellow    Attestation:  I (Dr. Marvel Munoz - Spine Surgeon) have personally evaluated patient with Spine Fellow Dr. Sanjiv Fischer, and agree with findings and plan outlined in  the note, which I also edited.  I discussed at length with the patient/family, explained the nature of spinal condition, and formulated workup and/or treatment plan together.  All questions were answered to the best of my ability and to patient's apparent satisfaction.     I personally spent >30 minutes on the date of the encounter doing chart review/review of outside records/review of test results/interpretation of tests/patient visit/documentation/discussion with other provider(s)/discussion with patient and family.

## 2025-07-24 ENCOUNTER — PRE VISIT (OUTPATIENT)
Dept: ORTHOPEDICS | Facility: CLINIC | Age: 35
End: 2025-07-24

## 2025-07-24 ENCOUNTER — OFFICE VISIT (OUTPATIENT)
Dept: ORTHOPEDICS | Facility: CLINIC | Age: 35
End: 2025-07-24
Payer: COMMERCIAL

## 2025-07-24 VITALS — WEIGHT: 113 LBS | BODY MASS INDEX: 17.74 KG/M2 | HEIGHT: 67 IN

## 2025-07-24 DIAGNOSIS — Z87.39 HISTORY OF FUSION OF SPINE FOR SCOLIOSIS: ICD-10-CM

## 2025-07-24 DIAGNOSIS — G89.29 ACUTE ON CHRONIC LOW BACK PAIN: ICD-10-CM

## 2025-07-24 DIAGNOSIS — Z98.1 HISTORY OF FUSION OF SPINE FOR SCOLIOSIS: ICD-10-CM

## 2025-07-24 DIAGNOSIS — M41.35 THORACOGENIC SCOLIOSIS OF THORACOLUMBAR REGION: ICD-10-CM

## 2025-07-24 DIAGNOSIS — Q67.5 CONGENITAL SCOLIOSIS: Primary | ICD-10-CM

## 2025-07-24 DIAGNOSIS — G95.0 SYRINX OF SPINAL CORD (H): ICD-10-CM

## 2025-07-24 DIAGNOSIS — M54.50 ACUTE ON CHRONIC LOW BACK PAIN: ICD-10-CM

## 2025-07-24 NOTE — LETTER
7/24/2025      Cayetano Agrawal  [address]  [place] [address]      Dear Colleague,    Thank you for referring your patient, Cayetano Agrawal, to the [facility] [place]. Please see a copy of my visit note below.    In-Person Visit    Spine Surgical History:  ??? - Attempted decompression of cervical syrinx which was aborted intra-op.   04/23/1997 - Part 1: Anterior release and interbody fusion T7-L1 with rib autograft (Dr. Jerome Lynn/Dr. Estrada; [facility]).  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  [Implants: Cotrel-Dubousset (CD) instrumentation]..   2000/2001? - Removal of posterior spinal instrumentation.      REASON FOR CONSULTATION: Consult (Scoloiosis and Synringiomylia / Rachael Gan PA-C in [facility] NEUROSURGERY  Imaging ON FILE)     REFERRING PHYSICIAN: Rachael Gan  PCP:Jose Antonio Moss    History of Present Illness:  35 year old male, referral from Rachael Gan PA-C for history of scoloiosis and syringomyelia.      Patient presents today for evaluation of his spine.  Patient states that he was diagnosed with a cervical syrinx and scoliosis as a young child.  He initially underwent surgery for decompression of his cervical syrinx but this was aborted intra-op by the surgeons given that it was too risky and that he was neurologically intact.  Additionally, the patient states that his parents were Jehovah's Witnesses which could have impacted this decision.  The patient does not have a great relationship with his mother and therefore is unclear of some of his history as a young child.  He states that he then underwent a fusion for his congenital scoliosis in 1997 and had the hardware removed 3 to 4 years later.  Patient states that this was performed at [facility] in [place].    Patient is  currently asymptomatic.  He presents to establish care and to obtain reassurance regarding his imaging.  He ultimately never had his syrinx treated.  He is a very active gentleman and states that he frequently performs 12-hour races which involves biking, canoeing, etc.  He states that he is asymptomatic while performing his activities but occasionally has some low back pain at the end of the day which resolves once he goes to sleep.  He denies any radicular pain into the upper or lower extremities.  He denies bowel or bladder incontinence.  He is able to ambulate without assistive devices.  He denies any other major medical problems but states that he intermittently gets migraine headaches.  He works as a  and does not smoke    Worse: After performing 12-hour multisport races  Better: Rest    Previous treatment:   Denies physical therapy, injections, medications    Previous Injections:  None    Oswestry (BENITA) Questionnaire        7/19/2025     9:39 AM   OSWESTRY DISABILITY INDEX   Count 10    Sum 1    Oswestry Score (%) 2 %        Patient-reported      BENITA  7/24/25: 2%       PROMIS-10 Scores  Global Mental Health Score: (Patient-Rptd) (P) 18  Global Physical Health Score: (Patient-Rptd) (P) 17  PROMIS TOTAL - SUBSCORES: (Patient-Rptd) (P) 35    ROS:  A 12-point review of systems was completed and is negative except for otherwise noted above in the history of present illness.    Med Hx:  Past Medical History:   Diagnosis Date     Syringomyelia (H)        Surg Hx:  Past Surgical History:   Procedure Laterality Date     CERVICAL FUSION      [facility] spine     SPINAL GROWTH RODS       TONSILLECTOMY         Allergies:  Allergies   Allergen Reactions     Topiramate Unknown     Poor memory, small headaches      Zonisamide Unknown       Meds:  Current Outpatient Medications   Medication Sig Dispense Refill     AIMOVIG 140 MG/ML injection Inject 1 mL (140 mg) Subcutaneous every 30 days (Patient not taking:  Reported on 3/28/2025) 1 mL 11     SUMAtriptan (IMITREX) 100 MG tablet Take 1 tablet (100 mg) by mouth at onset of headache for migraine Take 100 mg by mouth at onset of headache for migraine 18 tablet 11     No current facility-administered medications for this visit.       Fam Hx:  Family History   Problem Relation Age of Onset     Alcoholism Father        P/S Hx:  Social History     Tobacco Use     Smoking status: Never     Smokeless tobacco: Never   Substance Use Topics     Alcohol use: Yes     Alcohol/week: 2.0 standard drinks of alcohol       Physical Exam:  Very pleasant, healthy appearing, alert, oriented x 3, cooperative.  Normal mood and affect.  Not in cardiorespiratory distress.  There were no vitals taken for this visit.  Stands with a positive sagittal balance and slight left shift  Normal gait without assistive device.  No antalgia / imbalance.    Left rib hump with forward bending  Prior surgical incisions well-healed without erythema or drainage  Tenderness: (-) midline, (-) paraspinal, (-) R and L PSIS.  ROM: Limited flexion, extension, rotation of the spine secondary to prior fusion    Neuro Exam:  Motor: 5/5 strength bilateral upper and lower extremities  Sensory: Sensation intact to light touch bilateral upper and lower extremities  Reflexes: 2+ biceps, triceps, patella, Achilles bilaterally  Negative Jason's bilaterally  Negative clonus  Negative inverted radial reflex    Lower Extremity:  Equal leg lengths, full pulses, (-) atrophy / asymmetry.  Full painless passive knee and ankle motion.  Straight leg raise: (-) right, (-) left.      Imaging:    X-rays of the spine performed on 5/16/2025 demonstrates a left thoracolumbar scoliosis measuring approximately 49 degrees from T6-L3.  The posterior fusion mass appears to extend down to L3 and into the lower cervical spine    MRI of the cervical spine performed in 2012 demonstrates fusion of C2 and C3 along with an intermittent syrinx from C4-C6.   Additionally appears that the posterior fusion mass extends up to the mid cervical spine    Assessment:    35/m with:  1.  Cervical syringomyelia s/p attempted operative decompression around 1990 which was aborted intra-op   2/  History of prior T2-L2 instrumented fusion (Dr. Jerome Lynn; [facility]) in 1997 for kyphoscoliosis associated with cervical syrinx and congenital block vertebrae; followed by removal of instrumentation in 2000/2001 for congenital kyphoscoliosis  3/  Intermittent back pain with prolonged physical activity, otherwise asymptomatic    Plan:    Reviewed the diagnosis and imaging with the patient in depth.  We are reassured to hear that he is functioning well and is very active.  Additionally, he is largely asymptomatic with a normal physical exam.  We discussed the natural history of a cervical syrinx and stated that it could have decreased in size.  However, we would like to obtain a new MRI of the cervical and thoracic spine to evaluate the size of the cervical syrinx and rule out a thoracic syrinx.  We would additionally like to obtain a CT of the cervical, thoracic, lumbar spine to evaluate the extent of the prior fusion mass.  We discussed with the patient that if the cervical syrinx has increased in size then we will likely refer him to neurosurgery for further evaluation.  We will additionally work on obtaining records and imaging from the [facility] spine [place] along with the [facility] in [place].  Plan to see the patient back for a virtual visit after the images have been obtained.  Patient understands and agrees with this plan.  All questions answered to his satisfaction    - MRI of the cervical and thoracic spine to evaluate the size of the cervical syrinx and rule out a thoracic syrinx  - CT of the cervical, thoracic, lumbar spine to evaluate the extent of the prior fusion mass  - Retrieve records and imaging from [facility] [place]  -  Retrieve records and imaging from [facility]    Sanjiv Fischer, DO  Spine Fellow    Attestation:  I (Dr. Marvel Munoz - Spine Surgeon) have personally evaluated patient with Spine Fellow Dr. Sanjiv Fischer, and agree with findings and plan outlined in the note, which I also edited.  I discussed at length with the patient/family, explained the nature of spinal condition, and formulated workup and/or treatment plan together.  All questions were answered to the best of my ability and to patient's apparent satisfaction.     I personally spent >30 minutes on the date of the encounter doing chart review/review of outside records/review of test results/interpretation of tests/patient visit/documentation/discussion with other provider(s)/discussion with patient and family.      Again, thank you for allowing me to participate in the care of your patient.        Sincerely,        Marvel Munoz MD    Electronically signed

## 2025-08-22 ENCOUNTER — TELEPHONE (OUTPATIENT)
Dept: ORTHOPEDICS | Facility: CLINIC | Age: 35
End: 2025-08-22
Payer: COMMERCIAL